# Patient Record
Sex: FEMALE | Race: WHITE | NOT HISPANIC OR LATINO | Employment: FULL TIME | URBAN - METROPOLITAN AREA
[De-identification: names, ages, dates, MRNs, and addresses within clinical notes are randomized per-mention and may not be internally consistent; named-entity substitution may affect disease eponyms.]

---

## 2017-03-02 ENCOUNTER — APPOINTMENT (OUTPATIENT)
Dept: AUDIOLOGY | Age: 62
End: 2017-03-02
Payer: OTHER MISCELLANEOUS

## 2017-03-02 PROCEDURE — 92557 COMPREHENSIVE HEARING TEST: CPT | Performed by: AUDIOLOGIST

## 2017-03-02 PROCEDURE — 92567 TYMPANOMETRY: CPT | Performed by: AUDIOLOGIST

## 2017-04-18 DIAGNOSIS — M85.80 OTHER SPECIFIED DISORDERS OF BONE DENSITY AND STRUCTURE, UNSPECIFIED SITE: ICD-10-CM

## 2017-04-20 ENCOUNTER — HOSPITAL ENCOUNTER (OUTPATIENT)
Dept: RADIOLOGY | Age: 62
Discharge: HOME | End: 2017-04-20
Payer: COMMERCIAL

## 2017-04-20 DIAGNOSIS — Z12.31 ENCOUNTER FOR SCREENING MAMMOGRAM FOR MALIGNANT NEOPLASM OF BREAST: ICD-10-CM

## 2017-04-20 PROCEDURE — G0202 SCR MAMMO BI INCL CAD: HCPCS

## 2017-05-04 DIAGNOSIS — R92.8 OTHER ABNORMAL AND INCONCLUSIVE FINDINGS ON DIAGNOSTIC IMAGING OF BREAST: ICD-10-CM

## 2017-05-05 ENCOUNTER — GENERIC CONVERSION - ENCOUNTER (OUTPATIENT)
Dept: OTHER | Facility: OTHER | Age: 62
End: 2017-05-05

## 2017-05-05 ENCOUNTER — HOSPITAL ENCOUNTER (OUTPATIENT)
Dept: ULTRASOUND IMAGING | Facility: CLINIC | Age: 62
Discharge: HOME | End: 2017-05-05
Payer: COMMERCIAL

## 2017-05-05 ENCOUNTER — HOSPITAL ENCOUNTER (OUTPATIENT)
Dept: MAMMOGRAPHY | Facility: CLINIC | Age: 62
Discharge: HOME | End: 2017-05-05
Payer: COMMERCIAL

## 2017-05-05 DIAGNOSIS — R92.8 OTHER ABNORMAL AND INCONCLUSIVE FINDINGS ON DIAGNOSTIC IMAGING OF BREAST: ICD-10-CM

## 2017-05-05 PROCEDURE — G0206 DX MAMMO INCL CAD UNI: HCPCS

## 2017-05-05 PROCEDURE — 76642 ULTRASOUND BREAST LIMITED: CPT

## 2017-05-05 RX ORDER — MULTIVITAMIN
2 TABLET ORAL
COMMUNITY

## 2017-05-05 RX ORDER — TRIAMTERENE AND HYDROCHLOROTHIAZIDE 37.5; 25 MG/1; MG/1
TABLET ORAL
COMMUNITY
Start: 2012-01-05

## 2017-05-10 ENCOUNTER — HOSPITAL ENCOUNTER (OUTPATIENT)
Dept: MAMMOGRAPHY | Facility: CLINIC | Age: 62
Discharge: HOME | End: 2017-05-10
Payer: COMMERCIAL

## 2017-05-10 ENCOUNTER — CONVERSION ENCOUNTER (OUTPATIENT)
Dept: MAMMOGRAPHY | Facility: HOSPITAL | Age: 62
End: 2017-05-10

## 2017-05-10 ENCOUNTER — HOSPITAL ENCOUNTER (OUTPATIENT)
Dept: ULTRASOUND IMAGING | Facility: CLINIC | Age: 62
Discharge: HOME | End: 2017-05-10
Payer: COMMERCIAL

## 2017-05-10 ENCOUNTER — HOSPITAL ENCOUNTER (OUTPATIENT)
Dept: ULTRASOUND IMAGING | Facility: CLINIC | Age: 62
Discharge: HOME | End: 2017-05-10
Admitting: RADIOLOGY
Payer: COMMERCIAL

## 2017-05-10 VITALS — SYSTOLIC BLOOD PRESSURE: 120 MMHG | HEART RATE: 72 BPM | DIASTOLIC BLOOD PRESSURE: 70 MMHG

## 2017-05-10 DIAGNOSIS — R92.8 OTHER ABNORMAL AND INCONCLUSIVE FINDINGS ON DIAGNOSTIC IMAGING OF BREAST: ICD-10-CM

## 2017-05-10 DIAGNOSIS — R92.8 ABNORMAL FINDINGS ON DIAGNOSTIC IMAGING OF BREAST: ICD-10-CM

## 2017-05-10 DIAGNOSIS — R92.0 ABNORMAL FINDING ON MAMMOGRAPHY, MICROCALCIFICATION: ICD-10-CM

## 2017-05-10 PROCEDURE — 88341 IMHCHEM/IMCYTCHM EA ADD ANTB: CPT | Performed by: OBSTETRICS & GYNECOLOGY

## 2017-05-10 PROCEDURE — 88361 TUMOR IMMUNOHISTOCHEM/COMPUT: CPT | Performed by: OBSTETRICS & GYNECOLOGY

## 2017-05-10 PROCEDURE — 88305 TISSUE EXAM BY PATHOLOGIST: CPT | Performed by: OBSTETRICS & GYNECOLOGY

## 2017-05-10 PROCEDURE — 88342 IMHCHEM/IMCYTCHM 1ST ANTB: CPT | Performed by: OBSTETRICS & GYNECOLOGY

## 2017-05-10 PROCEDURE — 19084 BX BREAST ADD LESION US IMAG: CPT

## 2017-05-10 PROCEDURE — 19083 BX BREAST 1ST LESION US IMAG: CPT

## 2017-05-10 RX ORDER — LIDOCAINE HYDROCHLORIDE 10 MG/ML
4 INJECTION, SOLUTION INFILTRATION; PERINEURAL ONCE
Status: COMPLETED | OUTPATIENT
Start: 2017-05-10 | End: 2017-05-10

## 2017-05-10 RX ORDER — SODIUM BICARBONATE 42 MG/ML
1 INJECTION, SOLUTION INTRAVENOUS ONCE
Status: COMPLETED | OUTPATIENT
Start: 2017-05-10 | End: 2017-05-10

## 2017-05-10 RX ADMIN — SODIUM BICARBONATE 0.5 MEQ: 42 SOLUTION INTRAVENOUS at 10:39

## 2017-05-10 RX ADMIN — LIDOCAINE HYDROCHLORIDE 4 ML: 10 INJECTION, SOLUTION INFILTRATION; PERINEURAL at 10:39

## 2017-05-10 RX ADMIN — LIDOCAINE HYDROCHLORIDE 4 ML: 10 INJECTION, SOLUTION INFILTRATION; PERINEURAL at 10:30

## 2017-05-10 RX ADMIN — SODIUM BICARBONATE 0.5 MEQ: 42 SOLUTION INTRAVENOUS at 10:30

## 2017-05-17 ENCOUNTER — ALLSCRIPTS OFFICE VISIT (OUTPATIENT)
Dept: OTHER | Facility: OTHER | Age: 62
End: 2017-05-17

## 2017-05-18 ENCOUNTER — OFFICE VISIT (OUTPATIENT)
Dept: LAB | Facility: CLINIC | Age: 62
End: 2017-05-18
Payer: COMMERCIAL

## 2017-05-18 ENCOUNTER — TRANSCRIBE ORDERS (OUTPATIENT)
Dept: LAB | Facility: CLINIC | Age: 62
End: 2017-05-18

## 2017-05-18 ENCOUNTER — APPOINTMENT (OUTPATIENT)
Dept: LAB | Facility: CLINIC | Age: 62
End: 2017-05-18
Payer: COMMERCIAL

## 2017-05-18 ENCOUNTER — HOSPITAL ENCOUNTER (OUTPATIENT)
Dept: RADIOLOGY | Facility: HOSPITAL | Age: 62
Discharge: HOME | End: 2017-05-18
Attending: SURGERY
Payer: COMMERCIAL

## 2017-05-18 DIAGNOSIS — C50.412 MALIGNANT NEOPLASM OF UPPER-OUTER QUADRANT OF LEFT FEMALE BREAST (HCC): ICD-10-CM

## 2017-05-18 DIAGNOSIS — C50.412 MALIGNANT NEOPLASM OF UPPER-OUTER QUADRANT OF LEFT FEMALE BREAST (HCC): Primary | ICD-10-CM

## 2017-05-18 LAB
ALBUMIN SERPL BCP-MCNC: 4 G/DL (ref 3.5–5)
ALP SERPL-CCNC: 95 U/L (ref 46–116)
ALT SERPL W P-5'-P-CCNC: 44 U/L (ref 12–78)
ANION GAP SERPL CALCULATED.3IONS-SCNC: 9 MMOL/L (ref 4–13)
AST SERPL W P-5'-P-CCNC: 27 U/L (ref 5–45)
BASOPHILS # BLD AUTO: 0.02 THOUSANDS/ΜL (ref 0–0.1)
BASOPHILS NFR BLD AUTO: 0 % (ref 0–1)
BILIRUB SERPL-MCNC: 0.6 MG/DL (ref 0.2–1)
BUN SERPL-MCNC: 21 MG/DL (ref 5–25)
CALCIUM SERPL-MCNC: 9.1 MG/DL (ref 8.3–10.1)
CHLORIDE SERPL-SCNC: 107 MMOL/L (ref 100–108)
CO2 SERPL-SCNC: 28 MMOL/L (ref 21–32)
CREAT SERPL-MCNC: 0.72 MG/DL (ref 0.6–1.3)
EOSINOPHIL # BLD AUTO: 0.14 THOUSAND/ΜL (ref 0–0.61)
EOSINOPHIL NFR BLD AUTO: 2 % (ref 0–6)
ERYTHROCYTE [DISTWIDTH] IN BLOOD BY AUTOMATED COUNT: 12.9 % (ref 11.6–15.1)
GFR SERPL CREATININE-BSD FRML MDRD: >60 ML/MIN/1.73SQ M
GLUCOSE SERPL-MCNC: 92 MG/DL (ref 65–140)
HCT VFR BLD AUTO: 40.8 % (ref 34.8–46.1)
HGB BLD-MCNC: 13.9 G/DL (ref 11.5–15.4)
LYMPHOCYTES # BLD AUTO: 2.77 THOUSANDS/ΜL (ref 0.6–4.47)
LYMPHOCYTES NFR BLD AUTO: 36 % (ref 14–44)
MCH RBC QN AUTO: 30.5 PG (ref 26.8–34.3)
MCHC RBC AUTO-ENTMCNC: 34.1 G/DL (ref 31.4–37.4)
MCV RBC AUTO: 90 FL (ref 82–98)
MONOCYTES # BLD AUTO: 0.43 THOUSAND/ΜL (ref 0.17–1.22)
MONOCYTES NFR BLD AUTO: 6 % (ref 4–12)
NEUTROPHILS # BLD AUTO: 4.41 THOUSANDS/ΜL (ref 1.85–7.62)
NEUTS SEG NFR BLD AUTO: 56 % (ref 43–75)
PLATELET # BLD AUTO: 222 THOUSANDS/UL (ref 149–390)
PMV BLD AUTO: 10.2 FL (ref 8.9–12.7)
POTASSIUM SERPL-SCNC: 4.1 MMOL/L (ref 3.5–5.3)
PROT SERPL-MCNC: 7 G/DL (ref 6.4–8.2)
RBC # BLD AUTO: 4.56 MILLION/UL (ref 3.81–5.12)
SODIUM SERPL-SCNC: 144 MMOL/L (ref 136–145)
WBC # BLD AUTO: 7.77 THOUSAND/UL (ref 4.31–10.16)

## 2017-05-18 PROCEDURE — 36415 COLL VENOUS BLD VENIPUNCTURE: CPT

## 2017-05-18 PROCEDURE — 71020 HB CHEST X-RAY 2VW FRONTAL&LATL: CPT

## 2017-05-18 PROCEDURE — 93005 ELECTROCARDIOGRAM TRACING: CPT

## 2017-05-18 PROCEDURE — 80053 COMPREHEN METABOLIC PANEL: CPT

## 2017-05-18 PROCEDURE — 85025 COMPLETE CBC W/AUTO DIFF WBC: CPT

## 2017-05-19 LAB
ATRIAL RATE: 65 BPM
P AXIS: 55 DEGREES
PR INTERVAL: 152 MS
QRS AXIS: 36 DEGREES
QRSD INTERVAL: 76 MS
QT INTERVAL: 396 MS
QTC INTERVAL: 411 MS
T WAVE AXIS: 70 DEGREES
VENTRICULAR RATE: 65 BPM

## 2017-05-30 ENCOUNTER — HOSPITAL ENCOUNTER (OUTPATIENT)
Dept: NUCLEAR MEDICINE | Facility: HOSPITAL | Age: 62
Discharge: HOME | End: 2017-05-30
Attending: SURGERY
Payer: COMMERCIAL

## 2017-05-30 ENCOUNTER — HOSPITAL ENCOUNTER (OUTPATIENT)
Dept: MAMMOGRAPHY | Facility: HOSPITAL | Age: 62
Discharge: HOME | End: 2017-05-30
Attending: SURGERY
Payer: COMMERCIAL

## 2017-05-30 ENCOUNTER — ANESTHESIA (OUTPATIENT)
Dept: PERIOP | Facility: HOSPITAL | Age: 62
End: 2017-05-30
Payer: COMMERCIAL

## 2017-05-30 ENCOUNTER — ANESTHESIA EVENT (OUTPATIENT)
Dept: PERIOP | Facility: HOSPITAL | Age: 62
End: 2017-05-30
Payer: COMMERCIAL

## 2017-05-30 ENCOUNTER — HOSPITAL ENCOUNTER (OUTPATIENT)
Facility: HOSPITAL | Age: 62
Setting detail: OUTPATIENT SURGERY
Discharge: HOME | End: 2017-05-30
Attending: SURGERY | Admitting: SURGERY
Payer: COMMERCIAL

## 2017-05-30 ENCOUNTER — APPOINTMENT (OUTPATIENT)
Dept: MAMMOGRAPHY | Facility: HOSPITAL | Age: 62
End: 2017-05-30
Payer: COMMERCIAL

## 2017-05-30 VITALS
SYSTOLIC BLOOD PRESSURE: 124 MMHG | RESPIRATION RATE: 13 BRPM | HEART RATE: 82 BPM | DIASTOLIC BLOOD PRESSURE: 71 MMHG | BODY MASS INDEX: 21.86 KG/M2 | WEIGHT: 136 LBS | TEMPERATURE: 97.5 F | OXYGEN SATURATION: 96 % | HEIGHT: 66 IN

## 2017-05-30 VITALS
DIASTOLIC BLOOD PRESSURE: 75 MMHG | RESPIRATION RATE: 20 BRPM | HEART RATE: 89 BPM | SYSTOLIC BLOOD PRESSURE: 133 MMHG | OXYGEN SATURATION: 98 % | TEMPERATURE: 97.5 F

## 2017-05-30 DIAGNOSIS — C50.412 MALIGNANT NEOPLASM OF UPPER-OUTER QUADRANT OF LEFT FEMALE BREAST (HCC): ICD-10-CM

## 2017-05-30 PROCEDURE — 78195 LYMPH SYSTEM IMAGING: CPT

## 2017-05-30 PROCEDURE — 19282 PERQ DEVICE BREAST EA IMAG: CPT

## 2017-05-30 PROCEDURE — 88341 IMHCHEM/IMCYTCHM EA ADD ANTB: CPT | Performed by: SURGERY

## 2017-05-30 PROCEDURE — 88342 IMHCHEM/IMCYTCHM 1ST ANTB: CPT | Performed by: SURGERY

## 2017-05-30 PROCEDURE — 19281 PERQ DEVICE BREAST 1ST IMAG: CPT

## 2017-05-30 PROCEDURE — 88307 TISSUE EXAM BY PATHOLOGIST: CPT | Performed by: SURGERY

## 2017-05-30 PROCEDURE — A9541 TC99M SULFUR COLLOID: HCPCS

## 2017-05-30 RX ORDER — GLYCOPYRROLATE 0.2 MG/ML
INJECTION INTRAMUSCULAR; INTRAVENOUS AS NEEDED
Status: DISCONTINUED | OUTPATIENT
Start: 2017-05-30 | End: 2017-05-30 | Stop reason: SURG

## 2017-05-30 RX ORDER — FENTANYL CITRATE/PF 50 MCG/ML
25 SYRINGE (ML) INJECTION
Status: DISCONTINUED | OUTPATIENT
Start: 2017-05-30 | End: 2017-05-30 | Stop reason: HOSPADM

## 2017-05-30 RX ORDER — METHYLENE BLUE 10 MG/ML
INJECTION INTRAVENOUS AS NEEDED
Status: DISCONTINUED | OUTPATIENT
Start: 2017-05-30 | End: 2017-05-30 | Stop reason: HOSPADM

## 2017-05-30 RX ORDER — SCOLOPAMINE TRANSDERMAL SYSTEM 1 MG/1
PATCH, EXTENDED RELEASE TRANSDERMAL
Status: DISCONTINUED
Start: 2017-05-30 | End: 2017-05-30 | Stop reason: HOSPADM

## 2017-05-30 RX ORDER — SCOLOPAMINE TRANSDERMAL SYSTEM 1 MG/1
1 PATCH, EXTENDED RELEASE TRANSDERMAL
Status: DISCONTINUED | OUTPATIENT
Start: 2017-05-30 | End: 2017-05-30 | Stop reason: HOSPADM

## 2017-05-30 RX ORDER — OXYCODONE HYDROCHLORIDE AND ACETAMINOPHEN 5; 325 MG/1; MG/1
1 TABLET ORAL EVERY 4 HOURS PRN
Status: DISCONTINUED | OUTPATIENT
Start: 2017-05-30 | End: 2017-05-30 | Stop reason: HOSPADM

## 2017-05-30 RX ORDER — METOCLOPRAMIDE HYDROCHLORIDE 5 MG/ML
INJECTION INTRAMUSCULAR; INTRAVENOUS AS NEEDED
Status: DISCONTINUED | OUTPATIENT
Start: 2017-05-30 | End: 2017-05-30 | Stop reason: SURG

## 2017-05-30 RX ORDER — LIDOCAINE WITH 8.4% SOD BICARB 0.9%(10ML)
5 SYRINGE (ML) INJECTION ONCE
Status: COMPLETED | OUTPATIENT
Start: 2017-05-30 | End: 2017-05-30

## 2017-05-30 RX ORDER — FENTANYL CITRATE 50 UG/ML
INJECTION, SOLUTION INTRAMUSCULAR; INTRAVENOUS AS NEEDED
Status: DISCONTINUED | OUTPATIENT
Start: 2017-05-30 | End: 2017-05-30 | Stop reason: SURG

## 2017-05-30 RX ORDER — MEPERIDINE HYDROCHLORIDE 25 MG/ML
12.5 INJECTION INTRAMUSCULAR; INTRAVENOUS; SUBCUTANEOUS ONCE AS NEEDED
Status: COMPLETED | OUTPATIENT
Start: 2017-05-30 | End: 2017-05-30

## 2017-05-30 RX ORDER — BUPIVACAINE HYDROCHLORIDE AND EPINEPHRINE 2.5; 5 MG/ML; UG/ML
INJECTION, SOLUTION INFILTRATION; PERINEURAL AS NEEDED
Status: DISCONTINUED | OUTPATIENT
Start: 2017-05-30 | End: 2017-05-30 | Stop reason: HOSPADM

## 2017-05-30 RX ORDER — SODIUM CHLORIDE, SODIUM LACTATE, POTASSIUM CHLORIDE, CALCIUM CHLORIDE 600; 310; 30; 20 MG/100ML; MG/100ML; MG/100ML; MG/100ML
INJECTION, SOLUTION INTRAVENOUS CONTINUOUS PRN
Status: DISCONTINUED | OUTPATIENT
Start: 2017-05-30 | End: 2017-05-30 | Stop reason: SURG

## 2017-05-30 RX ORDER — ONDANSETRON 2 MG/ML
4 INJECTION INTRAMUSCULAR; INTRAVENOUS ONCE AS NEEDED
Status: DISCONTINUED | OUTPATIENT
Start: 2017-05-30 | End: 2017-05-30 | Stop reason: HOSPADM

## 2017-05-30 RX ORDER — LIDOCAINE HYDROCHLORIDE 10 MG/ML
INJECTION, SOLUTION INFILTRATION; PERINEURAL AS NEEDED
Status: DISCONTINUED | OUTPATIENT
Start: 2017-05-30 | End: 2017-05-30 | Stop reason: SURG

## 2017-05-30 RX ORDER — MIDAZOLAM HYDROCHLORIDE 1 MG/ML
INJECTION INTRAMUSCULAR; INTRAVENOUS AS NEEDED
Status: DISCONTINUED | OUTPATIENT
Start: 2017-05-30 | End: 2017-05-30 | Stop reason: SURG

## 2017-05-30 RX ORDER — ONDANSETRON 2 MG/ML
INJECTION INTRAMUSCULAR; INTRAVENOUS AS NEEDED
Status: DISCONTINUED | OUTPATIENT
Start: 2017-05-30 | End: 2017-05-30 | Stop reason: SURG

## 2017-05-30 RX ORDER — MAGNESIUM HYDROXIDE 1200 MG/15ML
LIQUID ORAL AS NEEDED
Status: DISCONTINUED | OUTPATIENT
Start: 2017-05-30 | End: 2017-05-30 | Stop reason: HOSPADM

## 2017-05-30 RX ORDER — PROPOFOL 10 MG/ML
INJECTION, EMULSION INTRAVENOUS AS NEEDED
Status: DISCONTINUED | OUTPATIENT
Start: 2017-05-30 | End: 2017-05-30 | Stop reason: SURG

## 2017-05-30 RX ORDER — KETOROLAC TROMETHAMINE 30 MG/ML
INJECTION, SOLUTION INTRAMUSCULAR; INTRAVENOUS AS NEEDED
Status: DISCONTINUED | OUTPATIENT
Start: 2017-05-30 | End: 2017-05-30 | Stop reason: SURG

## 2017-05-30 RX ORDER — SCOLOPAMINE TRANSDERMAL SYSTEM 1 MG/1
PATCH, EXTENDED RELEASE TRANSDERMAL
Status: DISCONTINUED
Start: 2017-05-30 | End: 2017-05-30 | Stop reason: WASHOUT

## 2017-05-30 RX ADMIN — METOCLOPRAMIDE HYDROCHLORIDE 10 MG: 5 INJECTION INTRAMUSCULAR; INTRAVENOUS at 08:18

## 2017-05-30 RX ADMIN — SODIUM CHLORIDE, SODIUM LACTATE, POTASSIUM CHLORIDE, AND CALCIUM CHLORIDE: .6; .31; .03; .02 INJECTION, SOLUTION INTRAVENOUS at 08:02

## 2017-05-30 RX ADMIN — LIDOCAINE HYDROCHLORIDE 5 ML: 10 INJECTION, SOLUTION INFILTRATION; PERINEURAL at 07:30

## 2017-05-30 RX ADMIN — MEPERIDINE HYDROCHLORIDE 12.5 MG: 25 INJECTION, SOLUTION INTRAMUSCULAR; INTRAVENOUS; SUBCUTANEOUS at 09:33

## 2017-05-30 RX ADMIN — KETOROLAC TROMETHAMINE 30 MG: 30 INJECTION, SOLUTION INTRAMUSCULAR at 08:50

## 2017-05-30 RX ADMIN — LIDOCAINE HYDROCHLORIDE 5 ML: 10 INJECTION, SOLUTION INFILTRATION; PERINEURAL at 07:25

## 2017-05-30 RX ADMIN — PROPOFOL 200 MG: 10 INJECTION, EMULSION INTRAVENOUS at 08:18

## 2017-05-30 RX ADMIN — DEXAMETHASONE SODIUM PHOSPHATE 10 MG: 10 INJECTION INTRAMUSCULAR; INTRAVENOUS at 08:18

## 2017-05-30 RX ADMIN — ONDANSETRON 4 MG: 2 INJECTION INTRAMUSCULAR; INTRAVENOUS at 08:18

## 2017-05-30 RX ADMIN — FENTANYL CITRATE 25 MCG: 50 INJECTION INTRAMUSCULAR; INTRAVENOUS at 09:51

## 2017-05-30 RX ADMIN — SCOPALAMINE 1 PATCH: 1 PATCH, EXTENDED RELEASE TRANSDERMAL at 08:14

## 2017-05-30 RX ADMIN — GLYCOPYRROLATE 0.2 MG: 0.2 INJECTION INTRAMUSCULAR; INTRAVENOUS at 08:18

## 2017-05-30 RX ADMIN — LIDOCAINE HYDROCHLORIDE 50 MG: 10 INJECTION, SOLUTION INFILTRATION; PERINEURAL at 08:18

## 2017-05-30 RX ADMIN — FENTANYL CITRATE 50 MCG: 50 INJECTION INTRAMUSCULAR; INTRAVENOUS at 08:20

## 2017-05-30 RX ADMIN — FENTANYL CITRATE 50 MCG: 50 INJECTION INTRAMUSCULAR; INTRAVENOUS at 08:42

## 2017-05-30 RX ADMIN — FENTANYL CITRATE 25 MCG: 50 INJECTION INTRAMUSCULAR; INTRAVENOUS at 09:46

## 2017-05-30 RX ADMIN — CEFAZOLIN SODIUM 1000 MG: 1 SOLUTION INTRAVENOUS at 08:14

## 2017-05-30 RX ADMIN — OXYCODONE HYDROCHLORIDE AND ACETAMINOPHEN 1 TABLET: 5; 325 TABLET ORAL at 10:45

## 2017-05-30 RX ADMIN — MIDAZOLAM HYDROCHLORIDE 2 MG: 1 INJECTION, SOLUTION INTRAMUSCULAR; INTRAVENOUS at 08:16

## 2017-06-21 ENCOUNTER — ALLSCRIPTS OFFICE VISIT (OUTPATIENT)
Dept: OTHER | Facility: OTHER | Age: 62
End: 2017-06-21

## 2017-06-29 LAB — SCAN RESULT: NORMAL

## 2017-07-13 ENCOUNTER — ALLSCRIPTS OFFICE VISIT (OUTPATIENT)
Dept: OTHER | Facility: OTHER | Age: 62
End: 2017-07-13

## 2017-07-14 ENCOUNTER — APPOINTMENT (OUTPATIENT)
Dept: RADIATION ONCOLOGY | Facility: HOSPITAL | Age: 62
End: 2017-07-14
Attending: RADIOLOGY
Payer: COMMERCIAL

## 2017-07-14 ENCOUNTER — GENERIC CONVERSION - ENCOUNTER (OUTPATIENT)
Dept: OTHER | Facility: OTHER | Age: 62
End: 2017-07-14

## 2017-07-14 PROCEDURE — 99215 OFFICE O/P EST HI 40 MIN: CPT | Performed by: RADIOLOGY

## 2017-07-17 ENCOUNTER — APPOINTMENT (OUTPATIENT)
Dept: RADIATION ONCOLOGY | Facility: CLINIC | Age: 62
End: 2017-07-17
Attending: RADIOLOGY
Payer: COMMERCIAL

## 2017-07-17 PROCEDURE — 77290 THER RAD SIMULAJ FIELD CPLX: CPT | Performed by: RADIOLOGY

## 2017-07-17 PROCEDURE — 77332 RADIATION TREATMENT AID(S): CPT | Performed by: RADIOLOGY

## 2017-07-17 PROCEDURE — 77293 RESPIRATOR MOTION MGMT SIMUL: CPT | Performed by: RADIOLOGY

## 2017-07-20 ENCOUNTER — GENERIC CONVERSION - ENCOUNTER (OUTPATIENT)
Dept: OTHER | Facility: OTHER | Age: 62
End: 2017-07-20

## 2017-07-20 PROCEDURE — 77295 3-D RADIOTHERAPY PLAN: CPT | Performed by: RADIOLOGY

## 2017-07-20 PROCEDURE — 77334 RADIATION TREATMENT AID(S): CPT | Performed by: RADIOLOGY

## 2017-07-20 PROCEDURE — 77300 RADIATION THERAPY DOSE PLAN: CPT | Performed by: RADIOLOGY

## 2017-07-24 ENCOUNTER — APPOINTMENT (OUTPATIENT)
Dept: RADIATION ONCOLOGY | Facility: HOSPITAL | Age: 62
End: 2017-07-24
Attending: RADIOLOGY
Payer: COMMERCIAL

## 2017-07-25 PROCEDURE — 77331 SPECIAL RADIATION DOSIMETRY: CPT | Performed by: RADIOLOGY

## 2017-07-25 PROCEDURE — 77412 RADIATION TX DELIVERY LVL 3: CPT | Performed by: RADIOLOGY

## 2017-07-25 PROCEDURE — 77280 THER RAD SIMULAJ FIELD SMPL: CPT | Performed by: RADIOLOGY

## 2017-07-25 PROCEDURE — 77387 GUIDANCE FOR RADJ TX DLVR: CPT | Performed by: RADIOLOGY

## 2017-07-26 PROCEDURE — 77412 RADIATION TX DELIVERY LVL 3: CPT | Performed by: RADIOLOGY

## 2017-07-26 PROCEDURE — 77387 GUIDANCE FOR RADJ TX DLVR: CPT | Performed by: RADIOLOGY

## 2017-07-27 DIAGNOSIS — C50.412 MALIGNANT NEOPLASM OF UPPER-OUTER QUADRANT OF LEFT FEMALE BREAST (HCC): ICD-10-CM

## 2017-07-27 PROCEDURE — 77387 GUIDANCE FOR RADJ TX DLVR: CPT | Performed by: RADIOLOGY

## 2017-07-27 PROCEDURE — 77412 RADIATION TX DELIVERY LVL 3: CPT | Performed by: RADIOLOGY

## 2017-07-28 PROCEDURE — 77387 GUIDANCE FOR RADJ TX DLVR: CPT | Performed by: RADIOLOGY

## 2017-07-28 PROCEDURE — 77412 RADIATION TX DELIVERY LVL 3: CPT | Performed by: RADIOLOGY

## 2017-07-31 PROCEDURE — 77417 THER RADIOLOGY PORT IMAGE(S): CPT | Performed by: RADIOLOGY

## 2017-07-31 PROCEDURE — 77387 GUIDANCE FOR RADJ TX DLVR: CPT | Performed by: RADIOLOGY

## 2017-07-31 PROCEDURE — 77412 RADIATION TX DELIVERY LVL 3: CPT | Performed by: RADIOLOGY

## 2017-07-31 PROCEDURE — 77336 RADIATION PHYSICS CONSULT: CPT | Performed by: RADIOLOGY

## 2017-08-01 ENCOUNTER — APPOINTMENT (OUTPATIENT)
Dept: RADIATION ONCOLOGY | Facility: HOSPITAL | Age: 62
End: 2017-08-01
Attending: RADIOLOGY
Payer: COMMERCIAL

## 2017-08-01 PROCEDURE — 77387 GUIDANCE FOR RADJ TX DLVR: CPT | Performed by: RADIOLOGY

## 2017-08-01 PROCEDURE — 77412 RADIATION TX DELIVERY LVL 3: CPT | Performed by: RADIOLOGY

## 2017-08-02 PROCEDURE — 77387 GUIDANCE FOR RADJ TX DLVR: CPT | Performed by: RADIOLOGY

## 2017-08-02 PROCEDURE — 77412 RADIATION TX DELIVERY LVL 3: CPT | Performed by: RADIOLOGY

## 2017-08-03 PROCEDURE — 77387 GUIDANCE FOR RADJ TX DLVR: CPT | Performed by: RADIOLOGY

## 2017-08-03 PROCEDURE — 77412 RADIATION TX DELIVERY LVL 3: CPT | Performed by: RADIOLOGY

## 2017-08-04 PROCEDURE — 77412 RADIATION TX DELIVERY LVL 3: CPT | Performed by: RADIOLOGY

## 2017-08-04 PROCEDURE — 77387 GUIDANCE FOR RADJ TX DLVR: CPT | Performed by: RADIOLOGY

## 2017-08-07 PROCEDURE — 77387 GUIDANCE FOR RADJ TX DLVR: CPT | Performed by: RADIOLOGY

## 2017-08-07 PROCEDURE — 77417 THER RADIOLOGY PORT IMAGE(S): CPT | Performed by: RADIOLOGY

## 2017-08-07 PROCEDURE — 77336 RADIATION PHYSICS CONSULT: CPT | Performed by: RADIOLOGY

## 2017-08-07 PROCEDURE — 77412 RADIATION TX DELIVERY LVL 3: CPT | Performed by: RADIOLOGY

## 2017-08-08 PROCEDURE — 77387 GUIDANCE FOR RADJ TX DLVR: CPT | Performed by: RADIOLOGY

## 2017-08-08 PROCEDURE — 77412 RADIATION TX DELIVERY LVL 3: CPT | Performed by: RADIOLOGY

## 2017-08-09 PROCEDURE — 77412 RADIATION TX DELIVERY LVL 3: CPT | Performed by: RADIOLOGY

## 2017-08-09 PROCEDURE — 77387 GUIDANCE FOR RADJ TX DLVR: CPT | Performed by: RADIOLOGY

## 2017-08-10 PROCEDURE — 77412 RADIATION TX DELIVERY LVL 3: CPT | Performed by: RADIOLOGY

## 2017-08-10 PROCEDURE — 77387 GUIDANCE FOR RADJ TX DLVR: CPT | Performed by: RADIOLOGY

## 2017-08-11 PROCEDURE — 77412 RADIATION TX DELIVERY LVL 3: CPT | Performed by: RADIOLOGY

## 2017-08-11 PROCEDURE — 77387 GUIDANCE FOR RADJ TX DLVR: CPT | Performed by: RADIOLOGY

## 2017-08-14 PROCEDURE — 77387 GUIDANCE FOR RADJ TX DLVR: CPT | Performed by: RADIOLOGY

## 2017-08-14 PROCEDURE — 77336 RADIATION PHYSICS CONSULT: CPT | Performed by: RADIOLOGY

## 2017-08-14 PROCEDURE — 77417 THER RADIOLOGY PORT IMAGE(S): CPT | Performed by: RADIOLOGY

## 2017-08-14 PROCEDURE — 77412 RADIATION TX DELIVERY LVL 3: CPT | Performed by: RADIOLOGY

## 2017-08-15 PROCEDURE — 77387 GUIDANCE FOR RADJ TX DLVR: CPT | Performed by: RADIOLOGY

## 2017-08-15 PROCEDURE — 77412 RADIATION TX DELIVERY LVL 3: CPT | Performed by: RADIOLOGY

## 2017-08-16 PROCEDURE — 77387 GUIDANCE FOR RADJ TX DLVR: CPT | Performed by: RADIOLOGY

## 2017-08-16 PROCEDURE — 77412 RADIATION TX DELIVERY LVL 3: CPT | Performed by: RADIOLOGY

## 2017-08-16 PROCEDURE — 77417 THER RADIOLOGY PORT IMAGE(S): CPT | Performed by: RADIOLOGY

## 2017-08-17 PROCEDURE — 77412 RADIATION TX DELIVERY LVL 3: CPT | Performed by: RADIOLOGY

## 2017-08-17 PROCEDURE — 77387 GUIDANCE FOR RADJ TX DLVR: CPT | Performed by: RADIOLOGY

## 2017-08-18 PROCEDURE — 77412 RADIATION TX DELIVERY LVL 3: CPT | Performed by: RADIOLOGY

## 2017-08-18 PROCEDURE — 77387 GUIDANCE FOR RADJ TX DLVR: CPT | Performed by: RADIOLOGY

## 2017-08-21 PROCEDURE — 77417 THER RADIOLOGY PORT IMAGE(S): CPT | Performed by: RADIOLOGY

## 2017-08-21 PROCEDURE — 77387 GUIDANCE FOR RADJ TX DLVR: CPT | Performed by: RADIOLOGY

## 2017-08-21 PROCEDURE — 77336 RADIATION PHYSICS CONSULT: CPT | Performed by: RADIOLOGY

## 2017-08-21 PROCEDURE — 77412 RADIATION TX DELIVERY LVL 3: CPT | Performed by: RADIOLOGY

## 2017-08-22 PROCEDURE — 77300 RADIATION THERAPY DOSE PLAN: CPT | Performed by: RADIOLOGY

## 2017-08-22 PROCEDURE — 77321 SPECIAL TELETX PORT PLAN: CPT | Performed by: RADIOLOGY

## 2017-08-22 PROCEDURE — 77387 GUIDANCE FOR RADJ TX DLVR: CPT | Performed by: RADIOLOGY

## 2017-08-22 PROCEDURE — 77334 RADIATION TREATMENT AID(S): CPT | Performed by: RADIOLOGY

## 2017-08-22 PROCEDURE — 77412 RADIATION TX DELIVERY LVL 3: CPT | Performed by: RADIOLOGY

## 2017-08-23 PROCEDURE — 77412 RADIATION TX DELIVERY LVL 3: CPT | Performed by: RADIOLOGY

## 2017-08-23 PROCEDURE — 77387 GUIDANCE FOR RADJ TX DLVR: CPT | Performed by: RADIOLOGY

## 2017-08-24 PROCEDURE — 77412 RADIATION TX DELIVERY LVL 3: CPT | Performed by: RADIOLOGY

## 2017-08-24 PROCEDURE — 77387 GUIDANCE FOR RADJ TX DLVR: CPT | Performed by: RADIOLOGY

## 2017-08-25 PROCEDURE — 77387 GUIDANCE FOR RADJ TX DLVR: CPT | Performed by: RADIOLOGY

## 2017-08-25 PROCEDURE — 77412 RADIATION TX DELIVERY LVL 3: CPT | Performed by: RADIOLOGY

## 2017-08-28 PROCEDURE — 77412 RADIATION TX DELIVERY LVL 3: CPT | Performed by: RADIOLOGY

## 2017-08-28 PROCEDURE — 77387 GUIDANCE FOR RADJ TX DLVR: CPT | Performed by: RADIOLOGY

## 2017-08-28 PROCEDURE — 77336 RADIATION PHYSICS CONSULT: CPT | Performed by: RADIOLOGY

## 2017-08-29 PROCEDURE — 77412 RADIATION TX DELIVERY LVL 3: CPT | Performed by: RADIOLOGY

## 2017-08-29 PROCEDURE — 77280 THER RAD SIMULAJ FIELD SMPL: CPT | Performed by: RADIOLOGY

## 2017-08-29 PROCEDURE — 77331 SPECIAL RADIATION DOSIMETRY: CPT | Performed by: RADIOLOGY

## 2017-08-30 PROCEDURE — 77387 GUIDANCE FOR RADJ TX DLVR: CPT | Performed by: RADIOLOGY

## 2017-08-30 PROCEDURE — 77412 RADIATION TX DELIVERY LVL 3: CPT | Performed by: RADIOLOGY

## 2017-08-31 PROCEDURE — 77412 RADIATION TX DELIVERY LVL 3: CPT | Performed by: RADIOLOGY

## 2017-08-31 PROCEDURE — 77387 GUIDANCE FOR RADJ TX DLVR: CPT | Performed by: RADIOLOGY

## 2017-09-01 ENCOUNTER — APPOINTMENT (OUTPATIENT)
Dept: RADIATION ONCOLOGY | Facility: HOSPITAL | Age: 62
End: 2017-09-01
Attending: RADIOLOGY
Payer: COMMERCIAL

## 2017-09-01 PROCEDURE — 77387 GUIDANCE FOR RADJ TX DLVR: CPT | Performed by: RADIOLOGY

## 2017-09-01 PROCEDURE — 77412 RADIATION TX DELIVERY LVL 3: CPT | Performed by: RADIOLOGY

## 2017-09-05 PROCEDURE — 77336 RADIATION PHYSICS CONSULT: CPT | Performed by: RADIOLOGY

## 2017-09-05 PROCEDURE — 77387 GUIDANCE FOR RADJ TX DLVR: CPT | Performed by: RADIOLOGY

## 2017-09-05 PROCEDURE — 77412 RADIATION TX DELIVERY LVL 3: CPT | Performed by: RADIOLOGY

## 2017-09-14 ENCOUNTER — ALLSCRIPTS OFFICE VISIT (OUTPATIENT)
Dept: OTHER | Facility: OTHER | Age: 62
End: 2017-09-14

## 2017-09-19 ENCOUNTER — GENERIC CONVERSION - ENCOUNTER (OUTPATIENT)
Dept: OTHER | Facility: OTHER | Age: 62
End: 2017-09-19

## 2017-09-19 DIAGNOSIS — Z00.00 ENCOUNTER FOR GENERAL ADULT MEDICAL EXAMINATION WITHOUT ABNORMAL FINDINGS: ICD-10-CM

## 2017-09-21 ENCOUNTER — APPOINTMENT (OUTPATIENT)
Dept: AUDIOLOGY | Age: 62
End: 2017-09-21
Payer: COMMERCIAL

## 2017-09-21 ENCOUNTER — TRANSCRIBE ORDERS (OUTPATIENT)
Dept: ADMINISTRATIVE | Facility: HOSPITAL | Age: 62
End: 2017-09-21

## 2017-09-21 ENCOUNTER — GENERIC CONVERSION - ENCOUNTER (OUTPATIENT)
Dept: OTHER | Facility: OTHER | Age: 62
End: 2017-09-21

## 2017-09-21 DIAGNOSIS — C50.912 MALIGNANT NEOPLASM OF LEFT FEMALE BREAST, UNSPECIFIED SITE OF BREAST: Primary | ICD-10-CM

## 2017-09-21 PROCEDURE — 92557 COMPREHENSIVE HEARING TEST: CPT | Performed by: AUDIOLOGIST

## 2017-09-21 PROCEDURE — 92567 TYMPANOMETRY: CPT | Performed by: AUDIOLOGIST

## 2017-10-03 ENCOUNTER — APPOINTMENT (OUTPATIENT)
Dept: RADIATION ONCOLOGY | Facility: HOSPITAL | Age: 62
End: 2017-10-03
Attending: RADIOLOGY
Payer: COMMERCIAL

## 2017-10-03 ENCOUNTER — GENERIC CONVERSION - ENCOUNTER (OUTPATIENT)
Dept: OTHER | Facility: OTHER | Age: 62
End: 2017-10-03

## 2017-10-03 PROCEDURE — 99214 OFFICE O/P EST MOD 30 MIN: CPT | Performed by: RADIOLOGY

## 2017-11-15 ENCOUNTER — ALLSCRIPTS OFFICE VISIT (OUTPATIENT)
Dept: OTHER | Facility: OTHER | Age: 62
End: 2017-11-15

## 2017-11-16 NOTE — PROGRESS NOTES
Assessment    1  Malignant neoplasm of upper-outer quadrant of left female breast (174 4) (C50 412)    Plan  Breast cancer, left breast, Carcinoma of left breast, estrogen receptor positive, Encounterfor gynecological examination without abnormal finding, Health Maintenance, Malignantneoplasm of upper-outer quadrant of left female breast    · Follow-up visit in 6 months Evaluation and Treatment  Follow-up  Status: Hold For -Scheduling  Requested for: 06NMF5404   Ordered; For: Breast cancer, left breast, Carcinoma of left breast, estrogen receptor positive, Encounter for gynecological examination without abnormal finding, Health Maintenance, Malignant neoplasm of upper-outer quadrant of left female breast; Ordered By: Kerry Manuel Performed:  Due: 62XNH0275  Malignant neoplasm of upper-outer quadrant of left female breast    · Anastrozole 1 MG Oral Tablet; TAKE 1 TABLET DAILY   Rx By: Kerry Manuel; Dispense: 90 Days ; #:1 X 90 Tablet Bottle; Refill: 1;Malignant neoplasm of upper-outer quadrant of left female breast; CLARISA = N; Verified Transmission to [employer]; Last Updated By: [employer]; 11/15/2017 3:36:16 PM    Discussion/Summary  Discussion Summary:   A 65year old postmenopausal woman with left breast cancer, stage IA, grade 2, % positive, HI 1-2% positive, HER-2 negative disease  She underwent lumpectomy with sentinel lymph node biopsy resulting in SHARYN  Her tumor was found to be low risk, based on the MammaPrint  Since July 2017, she has been on adjuvant hormonal therapy with anastrozole with no toxicity  Based on her symptomatology and physical examinations, she has no evidence recurrent disease  I recommended her to continue with anastrozole 1 mg once a day  She is in agreement with my recommendations  I will see her again in 6 months for routine follow-up        Chief Complaint  Chief Complaint: Chief Complaint:  The patient presents to the office today with Left breast cancer, stage IA(pT1b, pN0,M0) grade 2, % positive, OK 1-2% positive, HER-2 negative disease  MammaPrint low risk  Diagnosed in May 2017  Chief Complaint Free Text Note Form: Left breast cancer, stage IA(pT1b, pN0,M0) grade 2, % positive, OK 1-2% positive, HER-2 negative disease  MammaPrint low risk  Diagnosed in May 2017  History of Present Illness  HPI: A 62year old postmenopausal woman who was found to have abnormality in her left breast  Therefore, she underwent left breast biopsy in May 10, 2017, 9:00 position, 3 cm from the nipple on her left breast which showed invasive ductal carcinoma, grade 2  Subsequently, she underwent lumpectomy with sentinel lymph node biopsy by Dr Monalisa Bunch  She had 0 8Ã0 5Ã0 4 cm as well as 0 8Ã0 7Ã0 4 cm of invasive ductal carcinoma, grade 2  There was some lymphovascular invasion  One sentinel lymph node was negative for metastatic disease  This was % positive, OK 1-2% positive, HER-2 negative disease  Dr Monalisa Bunch sent her tumor tissue for MammaPrint, which came back low risk  She presented today with her daughter to discuss adjuvant treatment options  She feels well  She has no complaint of pain  She denied any respiratory symptoms  Her weight has been stable  She has osteopenia for which she was on oral bisphosphonate until a few years ago  Her most recent DEXA scan in April 2016 showed T score -2 4  She is up-to-date for colonoscopy  She has paternal grandmother as well as paternal aunt had a breast cancer  There is no first degree relative who has breast cancer  She is a lifetime never smoker  Her performance status is normal    Current Therapy: Adjuvant hormonal therapy with anastrozole since July 2017  Disease Status: SHARYN status post lumpectomy with sentinel lymph node biopsy  Interval History: A 36-year-old postmenopausal woman with stage IA left breast cancer, grade 2, % positive, OK 1-2% positive, HER2 negative disease   She underwent lumpectomy with sentinel lymph node biopsy, resulting in SHARYN  Her tumor was found to be low risk, based on the MammaPrint  Therefore, adjuvant chemotherapy was not indicated  Since July 2017, she has been on adjuvant hormonal therapy with anastrozole  She came in today for routine follow-up  She finished adjuvant radiation therapy in September 2017 with mild to moderate skin toxicity  She has no new complaint  She denied hot flashes  She also has no complaint of musculoskeletal symptoms  Her weight is stable  She has no respiratory symptoms, such as cough, sputum production or shortness of breast  Her performance status is normal       Review of Systems  Complete-Female:  Constitutional: No fever, no chills, feels well, no tiredness, no recent weight gain or weight loss  Eyes: No complaints of eye pain, no red eyes, no eyesight problems, no discharge, no dry eyes, no itching of eyes  ENT: no complaints of earache, no loss of hearing, no nose bleeds, no nasal discharge, no sore throat, no hoarseness  Cardiovascular: No complaints of slow heart rate, no fast heart rate, no chest pain, no palpitations, no leg claudication, no lower extremity edema  Respiratory: No complaints of shortness of breath, no wheezing, no cough, no SOB on exertion, no orthopnea, no PND  Gastrointestinal: No complaints of abdominal pain, no constipation, no nausea or vomiting, no diarrhea, no bloody stools  Genitourinary: No complaints of dysuria, no incontinence, no pelvic pain, no dysmenorrhea, no vaginal discharge or bleeding  Musculoskeletal: No complaints of arthralgias, no myalgias, no joint swelling or stiffness, no limb pain or swelling  Integumentary: No complaints of skin rash or lesions, no itching, no skin wounds, no breast pain or lump  Neurological: No complaints of headache, no confusion, no convulsions, no numbness, no dizziness or fainting, no tingling, no limb weakness, no difficulty walking    Psychiatric: Not suicidal, no sleep disturbance, no anxiety or depression, no change in personality, no emotional problems  Endocrine: No complaints of proptosis, no hot flashes, no muscle weakness, no deepening of the voice, no feelings of weakness  Hematologic/Lymphatic: No complaints of swollen glands, no swollen glands in the neck, does not bleed easily, does not bruise easily  ROS Reviewed:   ROS reviewed  Active Problems  1  Breast cancer, left breast (174 9) (C50 912)   2  Carcinoma of left breast, estrogen receptor positive (174 9,V86 0) (C50 912,Z17 0)   3  Encounter for gynecological examination without abnormal finding (V72 31) (Z01 419)   4  Irritable bowel syndrome (564 1) (K58 9)   5  Malignant neoplasm of upper-outer quadrant of left female breast (174 4) (C50 412)   6  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)   7  Osteopenia (733 90) (M85 80)   8  Seasonal allergies (477 9) (J30 2)    Past Medical History    1  History of Adenomatous endometrial hyperplasia (621 30) (N85 00)   2  History of Encounter for gynecological examination with Papanicolaou smear of cervix (V72 31) (Z01 419)   3  History of Encounter for routine gynecological examination (V72 31) (Z01 419)   4  History of Encounter for screening mammogram for malignant neoplasm of breast (V76 12) (Z12 31)   5  History of osteopenia (V13 59) (Z87 39)   6  History of radiation therapy (V15 3) (Z92 3)   7  History of sinusitis (V12 69) (Z87 09)   8  History of Mild cervical dysplasia (622 11) (N87 0)   9  History of Moderate dysplasia of cervix (APURVA II) (622 12) (N87 1)   10  Normal delivery (650) (O80,Z37 9)   11  History of Oral contraceptive prescribed (V25 01) (Z30 011)   12  History of Screening for human papillomavirus (HPV) (V73 81) (Z11 51)  Active Problems And Past Medical History Reviewed: The active problems and past medical history were reviewed and updated today  Surgical History  1  History of Appendectomy   2   History of Biopsy Breast Open   3  History of Biopsy Endometrial, Without Cervical Dilation   4  History of Cervical Loop Electrosurgical Excision (LEEP)   5  History of Cervical Surgery (Gyn) Laser Vaporization Of Transformation Zone   6  History of Colposcopy Cervix With Biopsy(S) With Endocervical Curettage   7  History of Left Breast Lumpectomy   8  History of [place] Lymph Node Biopsy   9  History of Tonsillectomy   10  History of Tubal Ligation  Surgical History Reviewed: The surgical history was reviewed and updated today  Family History  Mother    1  Family history of Nasopharyngeal carcinoma  Father    2  Family history of Coronary Artery Disease (V17 49)   3  Family history of malignant neoplasm of prostate (V16 42) (Z80 42)   4  Family history of Hypertension (V17 49)  Paternal Grandmother    11  Family history of malignant neoplasm of breast (V16 3) (Z80 3)  Paternal Aunt    10  Family history of malignant neoplasm of breast (V16 3) (Z80 3)  Family History Reviewed: The family history was reviewed and updated today  Social History     ·    · Exercising regularly (more than 90 min/week)   · Former smoker (V15 82) (H74 294)   · Living alone (V60 3) (Z60 2)   · No alcohol use   · No caffeine use   · Occupation  Social History Reviewed: The social history was reviewed and updated today  The social history was reviewed and is unchanged  Current Meds   1  Allegra Allergy TABS; TAKE 1 TABLET DAILY; Therapy: (Recorded:34Qsi7576) to Recorded   2  ALPRAZolam 0 25 MG Oral Tablet; TAKE 1 TABLET EVERY 12 HOURS AS NEEDED; Therapy: (Recorded:93Blc5556) to Recorded   3  Anastrozole 1 MG Oral Tablet; TAKE 1 TABLET DAILY; Therapy: 82DMW3703 to (22 419393)  Requested for: 24API5841; Last Rx:63Aka0803 Ordered   4  Calcium Citrate 500 MG CAPS; take 1 5 tablet twice daily with food; Therapy: (Recorded:81Sil6686) to Recorded   5  Daily Value Multivitamin TABS; TAKE 1 TABLET DAILY;  Therapy: (Recorded:34Uho4246) to Recorded   6  Hyoscyamine Sulfate 0 125 MG Sublingual Tablet Sublingual; PLACE 1 TABLET UNDER THE TONGUE  3 TIMES DAILY AS NEEDED; Therapy: (Recorded:17May2017) to Recorded   7  Ibuprofen 200 MG Oral Tablet; TAKE 1 TABLET 3-4 TIMES DAILY AS NEEDED; Therapy: (Recorded:17May2017) to Recorded   8  TH Vitamin B12 TABS; TAKE 1 TABLET DAILY AS DIRECTED; Therapy: [phone]) to Recorded   9  Triamterene-HCTZ 37 5-25 MG Oral Tablet; TAKE 1 TABLET EVERY OTHER DAY; Therapy: (Recorded:17May2017) to Recorded   10  Vitamin D3 5000 UNIT Oral Tablet; One daily; Therapy: (Recorded:17May2017) to Recorded  Medication List Reviewed: The medication list was reviewed and updated today  Allergies  1  Shellfish-derived Products  2  Dust   3  Grass   4  Mold   5  Shellfish    Vitals  Vital Signs    Recorded: 13EVO7167 03:19PM   Temperature 98 6 F   Heart Rate 79   Respiration 16   Systolic 861   Diastolic 78   Height 5 ft 4 in   Weight 138 lb    BMI Calculated 23 69   BSA Calculated 1 67   O2 Saturation 95       Physical Exam   Constitutional  General appearance: No acute distress, well appearing and well nourished  Eyes  Conjunctiva and lids: No swelling, erythema or discharge  Pupils and irises: Equal, round and reactive to light  Ears, Nose, Mouth, and Throat  External inspection of ears and nose: Normal    Otoscopic examination: Tympanic membranes translucent with normal light reflex  Canals patent without erythema  Nasal mucosa, septum, and turbinates: Normal without edema or erythema  Oropharynx: Normal with no erythema, edema, exudate or lesions  Pulmonary  Respiratory effort: No increased work of breathing or signs of respiratory distress  Auscultation of lungs: Clear to auscultation  Cardiovascular  Palpation of heart: Normal PMI, no thrills  Auscultation of heart: Normal rate and rhythm, normal S1 and S2, without murmurs     Examination of extremities for edema and/or varicosities: Normal    Carotid pulses: Normal    Abdomen  Abdomen: Non-tender, no masses  Liver and spleen: No hepatomegaly or splenomegaly  Lymphatic  Palpation of lymph nodes in neck: No lymphadenopathy  Musculoskeletal  Gait and station: Normal    Digits and nails: Normal without clubbing or cyanosis  Inspection/palpation of joints, bones, and muscles: Normal    Skin  Skin and subcutaneous tissue: Normal without rashes or lesions  Neurologic  Cranial nerves: Cranial nerves 2-12 intact  Reflexes: 2+ and symmetric  Sensation: No sensory loss  Psychiatric  Orientation to person, place, and time: Normal    Mood and affect: Normal    Additional Exam:  Breast exam; lumpectomy scar upper inner quadrant of the left breast with no palpable abnormalities  Right breast exam is negative  ECOG 0       Results/Data  Results Form:   Results  Pathology 0 8Ã0 5Ã0 4 cm as well as 0 8Ã0 7Ã0 4 cm of invasive ductal carcinoma, grade 2  There is a lymphovascular invasion present  One sentinel lymph node was negative for metastatic disease  % positive, [place] 1-2% positive, HER-2 negative disease  IA(pT1b, pN0,M0)  MammaPrint low risk  Radiology Chest x-ray was negative for bony disease  Lab Results CBC and CMP are within normal limits  Future Appointments    Date/Time Provider Specialty Site   05/03/2018 03:45 PM Brianna Ribera CRNP Surgical Oncology [facility]   09/17/2018 05:00 PM SLOAN Marinelli   Obstetrics/Gynecology [facility]       Signatures   Electronically signed by : SLOAN Leon ; Nov 15 2017  3:38PM EST                       (Author)

## 2018-01-10 NOTE — PROGRESS NOTES
Discussion/Summary  Social Work-Discussion Summary [facility]: Patient is being seen for a distress screenning assessment  Received and reviewed Pt's Distress Thermometer completed by Pt on 7/17/17 in the [address] [place] [address] Floor Oncology office  Pt rated her distress at a 1/10 and denied having any psychosocial problems  Based upon Pt's low distress score, a social work follow up is not indicated  If Pt expresses psychosocial needs, Cancer Counselor is available to provide counseling and intervention         Signatures   Electronically signed by : Jenifer Olson, [address]; Jul 20 2017 10:56AM EST                       (Author)

## 2018-01-10 NOTE — MISCELLANEOUS
Message   Recorded as Task   Date: 09/15/2017 09:21 AM, Created By: Sarath Maddox   Task Name: Miscellaneous   Assigned To: Bonita Pettit   Regarding Patient: Alexandria Fat, Status: In Progress   Comment:    Divya Campbell - 15 Sep 2017 9:21 AM     TASK CREATED  Caller: Self; [phone] (Mobile Phone)  pt called in regards to script for dexa scan and mammo   orders not in chart please mail to pt   Malorie Headley - 15 Sep 2017 9:26 AM     TASK IN PROGRESS   Malorie Headley - 15 Sep 2017 9:34 AM     TASK EDITED  lm for pt tcb re shahla slip due to hx breast cancer   Jayna Melvin - 18 Sep 2017 9:04 AM     TASK EDITED   Jayna Melvin - 18 Sep 2017 9:04 AM     TASK IN PROGRESS   Jayna Mlevin - 18 Sep 2017 9:10 AM     TASK EDITED  [facility] for pt to cb regarding the mammo order  Pt has cancer in left breast  Need to know if she will be following up the breast surgeon  ext: Magda Matthews - 19 Sep 2017 10:52 AM     TASK EDITED   Hector Ludwig - 19 Sep 2017 10:52 AM     TASK IN PROGRESS   Tiffanie Caceres - 19 Sep 2017 11:01 AM     TASK EDITED  Voice msg left for patient  Dexa scan RX will be sent to her, to be done after April 2018  An RX was in the computer for a mammogram from us, however she should follow up with her breast surgeon for the next few years unless she has been released from his care  A mammo RX was sent to her in case she needs it  Active Problems    1  Breast cancer, left breast (174 9) (C50 912)   2  Encounter for gynecological examination without abnormal finding (V72 31) (Z01 419)   3  Irritable bowel syndrome (564 1) (K58 9)   4  Malignant neoplasm of upper-outer quadrant of left female breast (174 4) (C50 412)   5  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 01)   6  Osteopenia (733 90) (M85 80)   7  Seasonal allergies (477 9) (J30 2)    Current Meds   1  Allegra Allergy TABS (Fexofenadine HCl); TAKE 1 TABLET DAILY; Therapy: (Recorded:46Kok1189) to Recorded   2  ALPRAZolam 0 25 MG Oral Tablet; TAKE 1 TABLET EVERY 12 HOURS AS NEEDED; Therapy: (Recorded:17May2017) to Recorded   3  Anastrozole 1 MG Oral Tablet; TAKE 1 TABLET DAILY; Therapy: 42FWE7368 to (22 [id number])  Requested for: 84BMP4935; Last   Rx:51Ngt2332 Ordered   4  Calcium Citrate 500 MG CAPS; take 1 5 tablet twice daily with food; Therapy: (Recorded:31Bhx2193) to Recorded   5  Daily Value Multivitamin TABS; TAKE 1 TABLET DAILY; Therapy: (Recorded:17May2017) to Recorded   6  Hyoscyamine Sulfate 0 125 MG Sublingual Tablet Sublingual; PLACE 1 TABLET   UNDER THE TONGUE  3 TIMES DAILY AS NEEDED; Therapy: (Recorded:17May2017) to Recorded   7  Ibuprofen 200 MG Oral Tablet; TAKE 1 TABLET 3-4 TIMES DAILY AS NEEDED; Therapy: (Recorded:17May2017) to Recorded   8  Triamterene-HCTZ 37 5-25 MG Oral Tablet; TAKE 1 TABLET EVERY OTHER DAY; Therapy: (Recorded:17May2017) to Recorded   9  Vitamin D3 5000 UNIT Oral Tablet; One daily; Therapy: (Recorded:17May2017) to Recorded    Allergies    1  Shellfish-derived Products    2  Dust   3  Grass   4  Mold   5  Shellfish    Plan  Malignant neoplasm of upper-outer quadrant of left female breast    · MAMMO SCREENING BILATERAL W 3D & CAD; Status:Hold For - Scheduling,Exact Date;   Requested for:After 47SRK3481;     Signatures   Electronically signed by : Brie Seo, ; Sep 19 2017 11:07AM EST                       (Author)

## 2018-01-10 NOTE — PROGRESS NOTES
Active Problems    1  Abnormal mammogram (793 80) (R92 8)   2  Encounter for gynecological examination with Papanicolaou smear of cervix (V72 31)   (Z01 419)   3  Encounter for gynecological examination without abnormal finding (V72 31) (Z01 419)   4  Encounter for routine gynecological examination (V72 31) (Z01 419)   5  Encounter for screening mammogram for malignant neoplasm of breast (V76 12)   (Z12 31)   6  MeniÃ¨re's disease (386 00) (H81 09)   7  Osteoporosis (733 00) (M81 0)   8  Screening for human papillomavirus (HPV) (V73 81) (Z11 51)    Current Meds   1  Alendronate Sodium 70 MG Oral Tablet (Fosamax); TAKE 1 TABLET ONCE WEEKLY; Therapy: 61SQG7697 to (Evaluate:80Zzi4327)  Requested for: 47Hvq3136; Last   Rx:70Bps3750 Ordered   2  Calcium 600+D 600-400 MG-UNIT Oral Tablet Recorded   3  [employer] Vitamin D 1000 UNIT CAPS Recorded   4  Triamterene-HCTZ 37 5-25 MG Oral Tablet; Therapy: 26ZUZ7184 to (Last Rx:05Jan2012)  Requested for: 77KRE7835 Ordered    Allergies    1  Shellfish-derived Products    2  Dust   3  Grass   4  Mold   5  Shellfish    Plan  Abnormal mammogram    · US GUIDED BREAST BIOPSY LEFT COMPLETE; Status:Active - Retrospective By  Protocol Authorization; Requested WYF:[id number]; Future Appointments    Date/Time Provider Specialty Site   09/14/2017 05:00 PM SLOAN Fowler  Obstetrics/Gynecology [facility] OB     Message   Recorded as Task   Date: 05/05/2017 10:38 AM, Created By: Ronnie Delgado   Task Name: Result Follow Up   Assigned To: Priya Kennedy   Regarding Patient: Ameliebrina Bal, Status: In Progress   Solomon Ingram - 05 May 2017 10:38 AM     TASK CREATED  check with patient about follow up for recommended breast biopsy   Prema Hoffman - 05 May 2017 10:41 AM     TASK IN PROGRESS   Prema Hoffman - 05 May 2017 10:49 AM     TASK REPLIED TO: Previously Assigned To Prema Hoffman  call the pt no answer left call bk number for pt to call me bk  Edis Preciado - 05 May 2017 10:50 AM     TASK REPLIED TO: Previously Assigned To [employer]   Electronically signed by : Kirsty Lerner MA; May  5 2017  1:24PM EST                       (Author)

## 2018-01-10 NOTE — MISCELLANEOUS
Message   Recorded as Task   Date: 04/15/2016 01:52 PM, Created By: Kade Obrien   Task Name: Call Back   Assigned To: Chelo Jacobsphoebe   Regarding Patient: Fauzia Hargrove, Status: In Progress   Comment:    Divya Campbell - 15 Apr 2016 1:52 PM     TASK CREATED  Caller: Self; [phone] (Home)  pt called requesting bone density results please advise pt @ [phone] - 15 Apr 2016 1:53 PM     TASK IN PROGRESS   DorisJan - 15 Apr 2016 2:11 PM     TASK EDITED  returned pt's p c   - per dr Tyron Jimenez, advised pt consult with her family   pt verbalized understanding  Active Problems    1  Encounter for gynecological examination with Papanicolaou smear of cervix   (V72 31,V76 2) (L06 914,J46 8)   2  Encounter for routine gynecological examination (V72 31) (Z01 419)   3  Encounter for screening mammogram for malignant neoplasm of breast (V76 12)   (Z12 31)   4  MeniÃ¨re's disease (386 00) (H81 09)   5  Osteoporosis (733 00) (M81 0)   6  Screening for human papillomavirus (HPV) (V73 81) (Z11 51)    Current Meds   1  Alendronate Sodium 70 MG Oral Tablet (Fosamax); TAKE 1 TABLET ONCE WEEKLY; Therapy: 06DZR7275 to (Evaluate:64Lkh6535)  Requested for: 51Pct1700; Last   Rx:01Itw5848 Ordered   2  Calcium 600+D 600-400 MG-UNIT Oral Tablet Recorded   3  [employer] Vitamin D 1000 UNIT CAPS Recorded   4  Triamterene-HCTZ 37 5-25 MG Oral Tablet; Therapy: 33CYZ9993 to (Last Rx:05Jan2012)  Requested for: 05WKF7031 Ordered    Allergies    1  Shellfish-derived Products    2  Dust   3  Grass   4  Mold   5   Shellfish    Signatures   Electronically signed by : Fran Schuler RN; Apr 15 2016  2:11PM EST                       (Author)

## 2018-01-11 NOTE — MISCELLANEOUS
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)   6  Osteopenia (733 90) (M85 80)   7  Seasonal allergies (477 9) (J30 2)    Current Meds   1  Allegra Allergy TABS (Fexofenadine HCl); TAKE 1 TABLET DAILY; Therapy: (Recorded:67Pzq9329) to Recorded   2  ALPRAZolam 0 25 MG Oral Tablet; TAKE 1 TABLET EVERY 12 HOURS AS NEEDED; Therapy: (Recorded:17May2017) to Recorded   3  Anastrozole 1 MG Oral Tablet; TAKE 1 TABLET DAILY; Therapy: 83DWV0385 to (22 181003)  Requested for: 10GZU0296; Last   Rx:82Zfu6972 Ordered   4  Calcium Citrate 500 MG CAPS; take 1 5 tablet twice daily with food; Therapy: (Recorded:14Hcv3128) to Recorded   5  Daily Value Multivitamin TABS; TAKE 1 TABLET DAILY; Therapy: (Recorded:17May2017) to Recorded   6  Hyoscyamine Sulfate 0 125 MG Sublingual Tablet Sublingual; PLACE 1 TABLET   UNDER THE TONGUE  3 TIMES DAILY AS NEEDED; Therapy: (Recorded:17May2017) to Recorded   7  Ibuprofen 200 MG Oral Tablet; TAKE 1 TABLET 3-4 TIMES DAILY AS NEEDED; Therapy: (Recorded:17May2017) to Recorded   8  Triamterene-HCTZ 37 5-25 MG Oral Tablet; TAKE 1 TABLET EVERY OTHER DAY; Therapy: (Recorded:17May2017) to Recorded   9  Vitamin D3 5000 UNIT Oral Tablet; One daily; Therapy: (Recorded:90Ejd7629) to Recorded    Allergies    1  Shellfish-derived Products    2  Dust   3  Grass   4  Mold   5   Shellfish    Plan  Health Maintenance    · * DXA BONE DENSITY SPINE HIP AND PELVIS; Status:Hold For -  Scheduling,Retrospective Authorization; Requested for:30Apr2018; Malignant neoplasm of upper-outer quadrant of left female breast    · MAMMO SCREENING BILATERAL W 3D & CAD; Status:Hold For -  Scheduling,Retrospective Authorization;  Requested for:30Apr2018;     Signatures   Electronically signed by : Hang Cornejo RN; Sep 19 2017  9:10AM EST                       (Author)

## 2018-01-12 VITALS
TEMPERATURE: 98.2 F | BODY MASS INDEX: 22.71 KG/M2 | WEIGHT: 136.31 LBS | OXYGEN SATURATION: 98 % | RESPIRATION RATE: 18 BRPM | SYSTOLIC BLOOD PRESSURE: 148 MMHG | DIASTOLIC BLOOD PRESSURE: 78 MMHG | HEART RATE: 96 BPM | HEIGHT: 65 IN

## 2018-01-12 NOTE — MISCELLANEOUS
Message   Recorded as Task   Date: 05/05/2017 08:41 AM, Created By: Connie Landin   Task Name: Follow Up   Assigned To: George Fee   Regarding Patient: April Blend, Status: In Progress   Comment:    Jeanna Hanks - 05 May 2017 8:41 AM     TASK CREATED  Pt is aware of biopsy recommendation and has an appointment on 5/10/17 for a left u/s guided breast biopsy  Please enter an order into Allscripts for the biopsy  Thank you! Shahzad Dress - 05 May 2017 8:46 AM     TASK IN PROGRESS   Shahzad Dress - 05 May 2017 8:47 AM     TASK EDITED  order in allscripts        Active Problems    1  Abnormal mammogram (793 80) (R92 8)   2  Encounter for gynecological examination with Papanicolaou smear of cervix (V72 31)   (Z01 419)   3  Encounter for gynecological examination without abnormal finding (V72 31) (Z01 419)   4  Encounter for routine gynecological examination (V72 31) (Z01 419)   5  Encounter for screening mammogram for malignant neoplasm of breast (V76 12)   (Z12 31)   6  MeniÃ¨re's disease (386 00) (H81 09)   7  Osteoporosis (733 00) (M81 0)   8  Screening for human papillomavirus (HPV) (V73 81) (Z11 51)    Current Meds   1  Alendronate Sodium 70 MG Oral Tablet (Fosamax); TAKE 1 TABLET ONCE WEEKLY; Therapy: 35GEI5746 to (Evaluate:90Vdq0453)  Requested for: 59Ybv3951; Last   Rx:81Pym3727 Ordered   2  Calcium 600+D 600-400 MG-UNIT Oral Tablet Recorded   3  [facility] Vitamin D 1000 UNIT CAPS Recorded   4  Triamterene-HCTZ 37 5-25 MG Oral Tablet; Therapy: 07DCP3752 to (Last Rx:05Jan2012)  Requested for: 55HPF1785 Ordered    Allergies    1  Shellfish-derived Products    2  Dust   3  Grass   4  Mold   5  Shellfish    Plan  Abnormal mammogram    · US GUIDED BREAST BIOPSY LEFT COMPLETE; Status:Hold For -  Scheduling,Retrospective By Protocol Authorization;  Requested for:91Dxq3557;     Signatures   Electronically signed by : Azalia Goodson, ; May  5 2017  8:47AM EST                       (Author)

## 2018-01-13 VITALS
SYSTOLIC BLOOD PRESSURE: 122 MMHG | BODY MASS INDEX: 22.88 KG/M2 | WEIGHT: 134 LBS | DIASTOLIC BLOOD PRESSURE: 64 MMHG | HEIGHT: 64 IN

## 2018-01-14 VITALS
SYSTOLIC BLOOD PRESSURE: 120 MMHG | BODY MASS INDEX: 23.24 KG/M2 | HEIGHT: 64 IN | HEART RATE: 68 BPM | RESPIRATION RATE: 16 BRPM | TEMPERATURE: 97.9 F | OXYGEN SATURATION: 99 % | DIASTOLIC BLOOD PRESSURE: 70 MMHG | WEIGHT: 136.13 LBS

## 2018-01-14 VITALS
OXYGEN SATURATION: 98 % | BODY MASS INDEX: 22.55 KG/M2 | DIASTOLIC BLOOD PRESSURE: 86 MMHG | SYSTOLIC BLOOD PRESSURE: 144 MMHG | WEIGHT: 135.31 LBS | TEMPERATURE: 98.6 F | RESPIRATION RATE: 18 BRPM | HEART RATE: 72 BPM | HEIGHT: 65 IN

## 2018-01-14 VITALS
HEART RATE: 79 BPM | RESPIRATION RATE: 16 BRPM | WEIGHT: 138 LBS | DIASTOLIC BLOOD PRESSURE: 78 MMHG | HEIGHT: 64 IN | TEMPERATURE: 98.6 F | OXYGEN SATURATION: 95 % | SYSTOLIC BLOOD PRESSURE: 114 MMHG | BODY MASS INDEX: 23.56 KG/M2

## 2018-01-14 VITALS
HEIGHT: 65 IN | SYSTOLIC BLOOD PRESSURE: 138 MMHG | HEART RATE: 100 BPM | WEIGHT: 136 LBS | RESPIRATION RATE: 18 BRPM | TEMPERATURE: 100 F | BODY MASS INDEX: 22.66 KG/M2 | DIASTOLIC BLOOD PRESSURE: 80 MMHG | OXYGEN SATURATION: 97 %

## 2018-01-14 NOTE — MISCELLANEOUS
Message   Recorded as Task   Date: 09/19/2017 02:36 PM, Created By: Radha Ferrer   Task Name: Call Back   Assigned To: Elen Garcia   Regarding Patient: Hany Ham, Status: Active   Comment:    Radha Ferrer - 19 Sep 2017 2:36 PM     TASK CREATED  Pt called office today, left voicemail message  Pt states lab orders for mammogram and DEXA scan have not arrived in the mail as of yet  Pt states she was told by [address] [place] [address] staff that her lab orders would arrive in the mail  Pt's provider is Dr Darrick Rosenbaum  This MA spoke directly with Dr Nishi Ray assistant, Natty Paz informed this MA that she left a detailed voicemail message on Pt's phone stating that Pt's DEXA scan prescription will be sent to her, to be done after April 2018  Natty Paz further informed this MA that she further stated on message that a prescription was entered in Allscripts for a mammogram, however, Pt should follow up with her breast surgeon for the next few years unless Pt has been released from breast surgeon's care (prescription for mammogram was sent to Pt in case she needs it)  Natty Paz informed this MA that Pt could contact her directly at office if she has any questions/concerns  This MA left voicemail message for Pt informing Pt that she can contact Natty Paz (MA) with any questions she may have @ [phone]  Active Problems    1  Breast cancer, left breast (174 9) (C50 912)   2  Encounter for gynecological examination without abnormal finding (V72 31) (Z01 419)   3  Irritable bowel syndrome (564 1) (K58 9)   4  Malignant neoplasm of upper-outer quadrant of left female breast (174 4) (C50 412)   5  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)   6  Osteopenia (733 90) (M85 80)   7  Seasonal allergies (477 9) (J30 2)    Current Meds   1  Allegra Allergy TABS (Fexofenadine HCl); TAKE 1 TABLET DAILY; Therapy: (Recorded:27Xuf8356) to Recorded   2   ALPRAZolam 0 25 MG Oral Tablet; TAKE 1 TABLET EVERY 12 HOURS AS NEEDED; Therapy: (Recorded:17May2017) to Recorded   3  Anastrozole 1 MG Oral Tablet; TAKE 1 TABLET DAILY; Therapy: 29CZX9370 to ([phone])  Requested for: 25ZYG9131; Last   Rx:80Esq1163 Ordered   4  Calcium Citrate 500 MG CAPS; take 1 5 tablet twice daily with food; Therapy: (Recorded:14Jul2017) to Recorded   5  Daily Value Multivitamin TABS; TAKE 1 TABLET DAILY; Therapy: (Recorded:17May2017) to Recorded   6  Hyoscyamine Sulfate 0 125 MG Sublingual Tablet Sublingual; PLACE 1 TABLET   UNDER THE TONGUE  3 TIMES DAILY AS NEEDED; Therapy: (Recorded:17May2017) to Recorded   7  Ibuprofen 200 MG Oral Tablet; TAKE 1 TABLET 3-4 TIMES DAILY AS NEEDED; Therapy: (Recorded:17May2017) to Recorded   8  Triamterene-HCTZ 37 5-25 MG Oral Tablet; TAKE 1 TABLET EVERY OTHER DAY; Therapy: (Recorded:17May2017) to Recorded   9  Vitamin D3 5000 UNIT Oral Tablet; One daily; Therapy: (Recorded:17May2017) to Recorded    Allergies    1  Shellfish-derived Products    2  Dust   3  Grass   4  Mold   5   Shellfish    Signatures   Electronically signed by : Tatyana Byrd MA; Sep 19 2017  2:36PM EST                       (Author)

## 2018-01-15 VITALS
RESPIRATION RATE: 18 BRPM | DIASTOLIC BLOOD PRESSURE: 90 MMHG | WEIGHT: 136 LBS | HEART RATE: 91 BPM | SYSTOLIC BLOOD PRESSURE: 140 MMHG | BODY MASS INDEX: 22.66 KG/M2 | TEMPERATURE: 98.9 F | HEIGHT: 65 IN | OXYGEN SATURATION: 98 %

## 2018-01-16 NOTE — MISCELLANEOUS
Message   Recorded as Task   Date: 05/05/2017 10:38 AM, Created By: Abbi Pena   Task Name: Result Follow Up   Assigned To: Adelaida Brower   Regarding Patient: Shayy Potter, Status: In Progress   Fredrick Valle - 05 May 2017 10:38 AM     TASK CREATED  check with patient about follow up for recommended breast biopsy   Joanie Chao - 05 May 2017 10:41 AM     TASK IN PROGRESS   Joanie Chao - 05 May 2017 10:49 AM     TASK REPLIED TO: Previously Assigned To Andriette Houston  call the pt no answer left call bk number for pt to call me bk  Joanie Chao - 05 May 2017 10:50 AM     TASK REPLIED TO: Previously Assigned To Alannah Silvia - 05 May 2017 1:30 PM     TASK EDITED  Pt has her breast biopsy 5/10  RX was placed in the computer  Active Problems    1  Abnormal mammogram (793 80) (R92 8)   2  Encounter for gynecological examination with Papanicolaou smear of cervix (V72 31)   (Z01 419)   3  Encounter for gynecological examination without abnormal finding (V72 31) (Z01 419)   4  Encounter for routine gynecological examination (V72 31) (Z01 419)   5  Encounter for screening mammogram for malignant neoplasm of breast (V76 12)   (Z12 31)   6  MeniÃ¨re's disease (386 00) (H81 09)   7  Osteoporosis (733 00) (M81 0)   8  Screening for human papillomavirus (HPV) (V73 81) (Z11 51)    Current Meds   1  Alendronate Sodium 70 MG Oral Tablet (Fosamax); TAKE 1 TABLET ONCE WEEKLY; Therapy: 04OAC8449 to (Evaluate:38Uge0017)  Requested for: 51Edo7692; Last   Rx:49Upe4680 Ordered   2  Calcium 600+D 600-400 MG-UNIT Oral Tablet Recorded   3  CVS Vitamin D 1000 UNIT CAPS Recorded   4  Triamterene-HCTZ 37 5-25 MG Oral Tablet; Therapy: 28VJW2778 to (Last Rx:05Jan2012)  Requested for: 45HJL3923 Ordered    Allergies    1  Shellfish-derived Products    2  Dust   3  Grass   4  Mold   5   Shellfish    Signatures   Electronically signed by : Catalina Li, ; May  5 2017  1:30PM EST (Author)

## 2018-01-22 VITALS
SYSTOLIC BLOOD PRESSURE: 124 MMHG | BODY MASS INDEX: 22.88 KG/M2 | RESPIRATION RATE: 16 BRPM | DIASTOLIC BLOOD PRESSURE: 78 MMHG | HEART RATE: 88 BPM | WEIGHT: 134 LBS | TEMPERATURE: 98.9 F | HEIGHT: 64 IN

## 2018-01-23 VITALS
BODY MASS INDEX: 23.24 KG/M2 | WEIGHT: 136.13 LBS | DIASTOLIC BLOOD PRESSURE: 70 MMHG | OXYGEN SATURATION: 99 % | RESPIRATION RATE: 16 BRPM | SYSTOLIC BLOOD PRESSURE: 120 MMHG | TEMPERATURE: 97.9 F | HEIGHT: 64 IN | HEART RATE: 68 BPM

## 2018-04-14 DIAGNOSIS — C50.912 MALIGNANT NEOPLASM OF LEFT FEMALE BREAST (HCC): ICD-10-CM

## 2018-04-14 DIAGNOSIS — Z00.00 ENCOUNTER FOR GENERAL ADULT MEDICAL EXAMINATION WITHOUT ABNORMAL FINDINGS: ICD-10-CM

## 2018-04-20 DIAGNOSIS — C50.412 MALIGNANT NEOPLASM OF UPPER-OUTER QUADRANT OF LEFT FEMALE BREAST (HCC): ICD-10-CM

## 2018-04-20 DIAGNOSIS — C50.912 MALIGNANT NEOPLASM OF LEFT FEMALE BREAST (HCC): ICD-10-CM

## 2018-04-23 ENCOUNTER — TRANSCRIBE ORDERS (OUTPATIENT)
Dept: RADIOLOGY | Facility: CLINIC | Age: 63
End: 2018-04-23

## 2018-04-26 ENCOUNTER — HOSPITAL ENCOUNTER (OUTPATIENT)
Dept: RADIOLOGY | Age: 63
Discharge: HOME | End: 2018-04-26
Payer: COMMERCIAL

## 2018-04-26 DIAGNOSIS — M85.80 OSTEOPENIA, UNSPECIFIED LOCATION: ICD-10-CM

## 2018-04-26 PROCEDURE — 77080 DXA BONE DENSITY AXIAL: CPT

## 2018-04-27 ENCOUNTER — HOSPITAL ENCOUNTER (OUTPATIENT)
Dept: MAMMOGRAPHY | Facility: CLINIC | Age: 63
Discharge: HOME | End: 2018-04-27
Payer: COMMERCIAL

## 2018-04-27 ENCOUNTER — TRANSCRIBE ORDERS (OUTPATIENT)
Dept: ADMINISTRATIVE | Facility: HOSPITAL | Age: 63
End: 2018-04-27

## 2018-04-27 DIAGNOSIS — N63.0 LUMP IN FEMALE BREAST: Primary | ICD-10-CM

## 2018-04-27 DIAGNOSIS — C50.912 MALIGNANT NEOPLASM OF LEFT FEMALE BREAST (HCC): ICD-10-CM

## 2018-04-27 PROBLEM — C50.412 MALIGNANT NEOPLASM OF UPPER-OUTER QUADRANT OF LEFT FEMALE BREAST (HCC): Status: ACTIVE | Noted: 2017-05-17

## 2018-04-27 PROCEDURE — G0279 TOMOSYNTHESIS, MAMMO: HCPCS

## 2018-04-27 PROCEDURE — 77066 DX MAMMO INCL CAD BI: CPT

## 2018-04-27 RX ORDER — ANASTROZOLE 1 MG/1
1 TABLET ORAL DAILY
COMMUNITY
Start: 2017-07-13 | End: 2018-05-16 | Stop reason: SDUPTHER

## 2018-04-30 ENCOUNTER — TELEPHONE (OUTPATIENT)
Dept: OBGYN CLINIC | Facility: CLINIC | Age: 63
End: 2018-04-30

## 2018-04-30 DIAGNOSIS — R92.8 ABNORMAL MAMMOGRAM: Primary | ICD-10-CM

## 2018-04-30 PROBLEM — Z17.0 CARCINOMA OF LEFT BREAST, ESTROGEN RECEPTOR POSITIVE (HCC): Status: ACTIVE | Noted: 2017-09-21

## 2018-04-30 PROBLEM — J30.2 SEASONAL ALLERGIES: Status: ACTIVE | Noted: 2017-05-17

## 2018-04-30 PROBLEM — H81.01 MENIERE'S DISEASE OF RIGHT EAR: Status: ACTIVE | Noted: 2017-07-14

## 2018-04-30 PROBLEM — M85.80 OSTEOPENIA: Status: ACTIVE | Noted: 2017-07-14

## 2018-04-30 PROBLEM — C50.912 CARCINOMA OF LEFT BREAST, ESTROGEN RECEPTOR POSITIVE (HCC): Status: ACTIVE | Noted: 2017-09-21

## 2018-04-30 PROBLEM — K58.9 IRRITABLE BOWEL SYNDROME: Status: ACTIVE | Noted: 2017-05-17

## 2018-04-30 PROBLEM — Z17.0 MALIGNANT NEOPLASM OF UPPER-OUTER QUADRANT OF LEFT BREAST IN FEMALE, ESTROGEN RECEPTOR POSITIVE (HCC): Status: ACTIVE | Noted: 2017-05-17

## 2018-04-30 NOTE — TELEPHONE ENCOUNTER
Pt needs 6 mo f/u diag r mammo   Slip entered  Pt aware  Pt said she had dexa result sent to her hematologist, Dr Tray Pandey? Pt had femoral neck result of - 2 5   In 2016 it was - 2 4  All other results stable  Pt mentioned prolia - not sure if you feel she needs it  Dexa  looks pretty stable  She had previous breast cancer  [phone]  Thank you    Not sure if I ever ent this to you on Mon  My error! She needs your input about her dexa   Thanks

## 2018-05-01 ENCOUNTER — APPOINTMENT (OUTPATIENT)
Dept: RADIATION ONCOLOGY | Facility: HOSPITAL | Age: 63
End: 2018-05-01
Attending: RADIOLOGY
Payer: COMMERCIAL

## 2018-05-01 ENCOUNTER — RADIATION ONCOLOGY FOLLOW-UP (OUTPATIENT)
Dept: RADIATION ONCOLOGY | Facility: HOSPITAL | Age: 63
End: 2018-05-01

## 2018-05-01 VITALS
BODY MASS INDEX: 22.76 KG/M2 | WEIGHT: 136.6 LBS | OXYGEN SATURATION: 98 % | SYSTOLIC BLOOD PRESSURE: 118 MMHG | HEART RATE: 76 BPM | DIASTOLIC BLOOD PRESSURE: 60 MMHG | TEMPERATURE: 98.1 F | HEIGHT: 65 IN | RESPIRATION RATE: 16 BRPM

## 2018-05-01 DIAGNOSIS — C50.412 MALIGNANT NEOPLASM OF UPPER-OUTER QUADRANT OF LEFT BREAST IN FEMALE, ESTROGEN RECEPTOR POSITIVE (HCC): Primary | ICD-10-CM

## 2018-05-01 DIAGNOSIS — Z17.0 MALIGNANT NEOPLASM OF UPPER-OUTER QUADRANT OF LEFT BREAST IN FEMALE, ESTROGEN RECEPTOR POSITIVE (HCC): Primary | ICD-10-CM

## 2018-05-01 PROCEDURE — 99215 OFFICE O/P EST HI 40 MIN: CPT | Performed by: RADIOLOGY

## 2018-05-01 RX ORDER — ALPRAZOLAM 0.25 MG/1
TABLET ORAL
COMMUNITY
End: 2018-11-14 | Stop reason: HOSPADM

## 2018-05-01 RX ORDER — RANITIDINE 150 MG/1
150 CAPSULE ORAL 2 TIMES DAILY
COMMUNITY
End: 2018-06-01 | Stop reason: ALTCHOICE

## 2018-05-01 RX ORDER — HYOSCYAMINE SULFATE 0.125 MG
0.12 TABLET ORAL EVERY 4 HOURS PRN
COMMUNITY
End: 2018-11-14 | Stop reason: HOSPADM

## 2018-05-01 NOTE — PROGRESS NOTES
Follow-up - Radiation Oncology   Magdy Lopez 1955 65 y o  female [id number]      History of Present Illness   Cancer Staging  See Below    Magdy Lopez is a 65y o  year old female with a history a left breast carcinoma with stage IA, pT1b, pN0, M0 grade 2, ER positive at 100%, NJ positive at 1-2% and HER-2/yanni negative invasive mammary carcinoma  She also had multiple foci of ductal carcinoma in situ  She is status post lumpectomy with negative margins and had a negative sentinel lymph node procedure  Her MammaPrint came back low risk  She was seen by Dr Rufus Cranker and recommendations were made for adjuvant hormonal therapy with anastrozole daily  In order to complete her breast conservation management, we recommended radiation therapy to the entire left breast that was completed on 9/5/17 9/14/17 FU Dr Valentine Cm: Yearly GYN exam done-negative  Last seen on 10/3/17  Interval History:  4/27/18 Bilateral diagnostic mammogeam  IMPRESSION:1  [place] are a few calcifications in the upper medial  right breast, for which six-month follow-up right diagnostic   mammogram is recommended to ensure stability  2   No mammographic evidence of malignancy on the left      ACR BI-RADS® Assessments: BiRad:3 - Probably Benign - Right     Recommendation:  Follow-up diagnostic mammogram of the right breast in 6 months      5/3/18 Surgical Oncology follow up     5/16/18 Dr Rufus Cranker follow-up     10/29/18 Right diagnostic Mammogram scheduled     Screening  Tobacco  Current tobacco user: yes  If yes, brief counseling provided: NA     Hypertension  Hypertension screening performed: yes  Normotensive:  yes  If no, referred to PCP: n/a     Depression Screening  Screened for depression using PHQ-2: no     Screened for depression using PHQ-9:  yes  Screening positive or negative:  negative  If score >4, was any of the following actions taken?    Additional evaluation for depression, suicide risk assesment, referral to PCP or psychiatry, medication started:  no, n/a     Advanced Care Planning for Patients >65 years  Advanced Care Planning Discussed:  yes  Patient named surrogate decision maker or care plan in chart:  no     There are no preventive care reminders to display for this patient  Historical Information      Malignant ne[facility] of upper-outer quadrant of left breast in female, [facility]    5/10/2017 Initial Diagnosis     Malignant neoplasm of upper-outer quadrant of left female breast ([facility] )         5/10/2017 Biopsy     Left breast biopsy  Invasive ductal carcinoma, Grade 2   % positive, NH 1-2% positive, HER2 negative         5/30/2017 Surgery     Left lumpectomy  2 foci of Grade 2 invasive ductal carcinoma with DCIS present         5/30/2017 -  Cancer Staged     Stage IA pT1b(m) pN0         7/2017 -  Hormone Therapy     Anastrozole - Dr Zahra Obrien         7/25/2017 - 9/5/2017 Radiation     Left breast   5000 cGy to the entire breast and a total dose of 6000 cGy to the lumpectomy cavity  Past Medical History:   Diagnosis Date    Acid reflux     [facility]     History of external beam radiation therapy     Irritable bowel syndrome     Meniere's disease     Osteopenia of the elderly     PONV (postoperative nausea and vomiting)      Past Surgical History:   Procedure Laterality Date    APPENDECTOMY      BREAST LUMPECTOMY      BREAST SURGERY Left     biopsy    CRYOABLATION      NH BIOPSY/EXCISION, LYMPH NODE(S) Left 5/30/2017    Procedure: LYMPHOSCINTIGRAPHY; INTRAOPERATIVE LYMPHATIC MAPPING; SENTINEL LYMPH NODE BIOPSY (INJECT BY DR RIOS AT 6218); Surgeon: Aristeo De La Cruz MD;  [facility];  Service: Surgical Oncology    NH PERQ DEVICE PLACEMT BREAST LOC 1ST LES W GUIDNCE Left 5/30/2017    Procedure: BREAST TWO SITE LUMPECTOMY; BREAST TWO SITE NEEDLE LOCALIZATION (TWO SITE NEEDLE LOC AT 0700);   Surgeon: Aristeo De La Cruz MD;  [facility];  Service: Surgical Oncology    TONSILLECTOMY         Social History   History   Alcohol Use No     History   Drug Use No     History   Smoking Status    Former Smoker    Quit date: 1974   Smokeless Tobacco    Never Used     Meds/Allergies     Current Outpatient Prescriptions:     ALPRAZolam (XANAX) 0 25 mg tablet, Take by mouth daily at bedtime as needed for anxiety, Disp: , Rfl:     anastrozole (ARIMIDEX) 1 mg tablet, Take 1 tablet by mouth daily, Disp: , Rfl:     CALCIUM CITRATE PO, Take 2 tablets by mouth 2 (two) times daily after meals  , Disp: , Rfl:     Cholecalciferol (VITAMIN D3) 5000 units TABS, Take by mouth, Disp: , Rfl:     hyoscyamine (ANASPAZ,LEVSIN) 0 125 MG tablet, Take 0 125 mg by mouth every 4 (four) hours as needed for cramping, Disp: , Rfl:     Multiple Vitamin (MULTIVITAMIN) tablet, Take 2 tablets by mouth daily in the early morning  , Disp: , Rfl:     psyllium (METAMUCIL) 58 6 % packet, Take 1 packet by mouth daily, Disp: , Rfl:     ranitidine (ZANTAC) 150 MG capsule, Take 150 mg by mouth 2 (two) times a day, Disp: , Rfl:     triamterene-hydrochlorothiazide (MAXZIDE-25) 37 5-25 mg per tablet, Triamterene-HCTZ 37 5-25 MG Oral Tablet  Quantity: 100 00; Refills: 0   Started 5-Jan-2012 Active, Disp: , Rfl:   Allergies   Allergen Reactions    Dust Mite Extract Other (See Comments)     sneezing    Molds & Smuts Allergic Rhinitis    Shellfish Allergy GI Intolerance    Shellfish-Derived Products GI Intolerance and Vomiting     Review of Systems  Constitutional: Negative  HENT: Negative  Eyes: Negative  Respiratory: Positive for cough  Shortness of breath: dry cough from acid reflux  Cardiovascular: Negative  Gastrointestinal: Positive for abdominal pain (at times due to IBS and acid reflux  )  Constipation: IBS  Diarrhea: IBS  Endocrine: Negative  Genitourinary:        Nocturia at time  Musculoskeletal: Negative  Skin: Negative      Allergic/Immunologic: Positive for environmental allergies and food allergies  Neurological: Positive for dizziness  Hematological: Negative  Psychiatric/Behavioral: Negative    OBJECTIVE:   /60 (BP Location: Left arm, Patient Position: Sitting, Cuff Size: Standard)   Pulse 76   Temp 98 1 °F (36 7 °C) (Temporal)   Resp 16   Ht 5' 4 5" (1 638 m)   Wt 62 kg (136 lb 9 6 oz)   SpO2 98%   BMI 23 09 kg/m²   Pain Assessment:  0  ECOG/Zubrod/WHO: 0 - Asymptomatic    Physical Exam   Constitutional: She is oriented to person, place, and time  She appears well-developed and well-nourished  No distress  HENT:   Head: Normocephalic and atraumatic  Mouth/Throat: No oropharyngeal exudate  Eyes: Conjunctivae and EOM are normal  Pupils are equal, round, and reactive to light  No scleral icterus  Neck: Normal range of motion  Neck supple  No tracheal deviation present  No thyromegaly present  Cardiovascular: Normal rate, regular rhythm and normal heart sounds  Pulmonary/Chest: Effort normal and breath sounds normal  No respiratory distress  She has no wheezes  She has no rales  She exhibits no tenderness  Right breast exhibits no inverted nipple, no mass, no nipple discharge, no skin change and no tenderness  Left breast exhibits no inverted nipple, no mass, no nipple discharge, no skin change (Left breast incision well healed with no induration and no masses ) and no tenderness  Abdominal: Soft  Bowel sounds are normal  She exhibits no distension and no mass  There is no tenderness  Musculoskeletal: Normal range of motion  She exhibits no edema or tenderness  Lymphadenopathy:     She has no cervical adenopathy  Neurological: She is alert and oriented to person, place, and time  No cranial nerve deficit  Coordination normal    Skin: Skin is warm and dry  No rash noted  She is not diaphoretic  No erythema  No pallor  Psychiatric: She has a normal mood and affect   Her behavior is normal  Judgment and thought content normal  Nursing note and vitals reviewed  RESULTS    Lab Results: No results found for this or any previous visit (from the past 672 hour(s))  Imaging Studies:Dxa Bone Density Spine Hip And Pelvis    Result Date: 4/30/2018  Narrative: CENTRAL  DXA SCAN CLINICAL HISTORY:   65year old post-menopausal  female risk factors include [breast carcinoma, status post radiation therapy and chemotherapy (anastrozole)  Prior treatment for osteoporosis (discontinued March, 2017)  TECHNIQUE: Bone densitometry was performed using a Horizon A bone densitometer  Regions of interest appear properly placed  There are no obvious fractures or other confounding variables which could limit the study  COMPARISON:  Several prior studies, most recent April 14, 2016 RESULTS: LUMBAR SPINE:  L1-L4: BMD 0 929 gm/cm2 T-score -1 1 Z-score 0 5 LEFT TOTAL HIP: BMD 0 845 gm/cm2 T-score -0 8 Z-score 0 2 LEFT FEMORAL NECK: BMD 0 577 gm/cm2 T-score -2 5 Z-score -1 1     Impression: 1  Based on the [facility] classification, the T-score of -2 5 in the left hip is consistent with osteoporosis  2  When compared to the prior examination, there has been a  3 % INCREASE in the total bone mineral density of the lumbar spine  There has been no significant change in the total bone mineral density of the left hip  3   According to the [facility], prescription therapy is recommended with a T-score of -2 5 or less in the spine or hip after appropriate evaluation to exclude secondary causes  4   A daily intake of at least 1200 mg Calcium and 800 to 1000 IU of Vitamin D, as well as weight bearing and muscle strengthening exercise, fall prevention and avoidance of tobacco and excessive alcohol intake as  basic preventive measures are suggested  5   Repeat DXA  in 18 - 24 months, on the same machine, as clinically indicated   The 10 year risk of hip fracture is 2%, with the 10 year risk of major osteoporotic fracture being 10%, as calculated by the [facility] fracture risk assessment tool (FRAX)  The current NOF guidelines recommend treating patients with FRAX 10 year risk score of  >3% for hip fracture and >20% for major osteoporotic fracture  WHO CLASSIFICATION: Normal (a T-score of -1 0 or higher) Low bone mineral density (a T-score of less than -1 0 but higher than -2 5) Osteoporosis (a T-score of -2 5 or less) Severe osteoporosis (a T-score of -2 5 or less with a fragility fracture)   Workstation performed: [id number]     Mammo Diagnostic Bilateral W 3d & Cad    Result Date: 4/27/2018  Narrative: Patient History: Patient is postmenopausal and has 2 history of cancer in the left breast at age 62  Family history of breast cancer at age 52 in paternal aunt, premenopausal breast cancer at age 39 in paternal grandmother, prostate cancer at age 62 in father  Malignant lumpectomy of the left breast, May 10, 2017  Malignant US guidance breast biopsy left (ea add), May 10, 2017  Malignant US guided breast biopsy left, May 10, 2017  Mammo Pathology Letter of the left breast, May 10, 2017  Benign excisional biopsy of the left breast, 1997  Took hormonal contraceptives for 1 year  Patient is a former smoker, and smoked for 2 years  Patient's BMI is 21 6  Reason for exam: history of breast cancer, conservation therapy  70-year-old female, status post left lumpectomy in 2017  Mammo Diagnostic Bilateral W DBT and CAD: April 27, 2018 - Check In #: [de-identified] 2D/3D Procedure 3D views: Bilateral MLO view(s) were taken  2D views: Bilateral CC view(s) were taken  MLO view(s) were taken of the left breast  Technologist: MELANIA Martinez (MELANIA)(M) Prior study comparison: May 5, 2017, mammo diagnostic left W CAD performed at [address] [place] [address]  April 20, 2017, mammo screening bilateral W CAD, performed at [facility]  April 14, 2016, mammo screening bilateral W CAD, performed at [facility]    March 31, 2015, digital bilateral screening mammogram, performed at [address] [facility]  March 25, 2014, digital bilateral screening mammogram, performed at [address] [place] [address]  March 20, 2013, digital bilateral screening mammogram, performed at [address] [place] [address]  February 21, 2012, digital bilateral screening mammogram, performed at [address] [place] [address]  February 9, 2011, digital bilateral screening mammogram, performed at [address] [place] [address]  February 8, 2010, digital bilateral screening mammogram, performed at [address] [place] [address]  There are scattered fibroglandular densities  There are postsurgical changes in the left breast from prior lumpectomy  On the current study, a few calcifications are seen in the upper medial right breast at approximately the 2 o'clock position, 3 cm deep to the nipple  These do not have a worrisome appearance but I would recommend that the patient return in 6 months for repeat diagnostic mammography to ensure continued stability  No dominant masses are seen  There is left breast skin thickening secondary to postsurgical and post radiation changes  There is no skin thickening on the right  The axillary regions are benign  IMPRESSION:1  There are a few calcifications in the upper medial right breast, for which six-month follow-up right diagnostic mammogram is recommended to ensure stability  2   No mammographic evidence of malignancy on the left  ACR BI-RADS® Assessments: BiRad:3 - Probably Benign - Right Recommendation: Follow-up diagnostic mammogram of the right breast in 6 months  The patient is scheduled in a reminder system for screening mammography  8-10% of cancers will be missed on mammography  Management of a palpable abnormality must be based on clinical grounds  Patients will be notified of their results via letter from our facility  Accredited by [employer] of Radiology and FDA   Transcription Location: [address] [place], [place] [address]way: [id number] Risk Value(s): [employer] Table: 50 0%, MRS : Based on personal and/or family history, consideration of hereditary risk assessment may be warranted  Assessment/Plan:  No orders of the defined types were placed in this encounter  Rachel Youssef is a 65y o  year old female with a left breast carcinoma with stage IA, pT1b, pN0, M0 grade 2, ER positive at 100%, NC positive at 1-2% and HER-2/yanni negative invasive mammary carcinoma  She also had multiple foci of ductal carcinoma in situ  She is status post lumpectomy with negative margins and had a negative sentinel lymph node procedure  Her MammaPrint came back low risk  She was seen by Dr La Nena Vyas and recommendations were made for adjuvant hormonal therapy with anastrozole daily  In order to complete her breast conservation management, we recommended radiation therapy to the entire left breast that was completed on 9/5/17  She returns today for follow-up examination  She continues to apply a moisturizer and massage the treated left breast on a daily basis  She is tolerating anastrozole well  She denies any hot flashes  She had DEXA scan April 30, 2018 that reveals some osteoporosis in the left hip  She is on calcium with vitamin-D supplementation  She remains active with walking and exercising regularly  She has follow-up in 2weeks with Dr La Nena Vyas  She had bilateral mammogram April 27, 2018 that was negative  She will see Dr Carl Bernal May 3, 2018  She will return here for follow-up in 6 months  Alfreda Schultz MD  1/3/5818,5:63 AM    Portions of the record may have been created with voice recognition software   Occasional wrong word or "sound a like" substitutions may have occurred due to the inherent limitations of voice recognition software   Read the chart carefully and recognize, using context, where substitutions have occurred

## 2018-05-01 NOTE — PROGRESS NOTES
Rosalie Rivera  1955   Ms Hollie Lima is a 65 y o  female       Chief Complaint   Patient presents with    Follow-up       Cancer Staging  No matching staging information was found for the patient  Malignant neoplasm of upper-outer quadrant of left breast in female, estrogen receptor positive ([facility] )    5/10/2017 Initial Diagnosis     Malignant neoplasm of upper-outer quadrant of left female breast ([facility] )         5/10/2017 Biopsy     Left breast biopsy  Invasive ductal carcinoma, Grade 2   % positive, AK 1-2% positive, HER2 negative         5/30/2017 Surgery     Left lumpectomy  2 foci of Grade 2 invasive ductal carcinoma with DCIS present         5/30/2017 -  Cancer Staged     Stage IA pT1b(m) pN0         7/2017 -  Hormone Therapy     Anastrozole - Dr Vinay Guzman         7/25/2017 - 9/5/2017 Radiation     Left breast   5000 cGy to the entire breast and a total dose of 6000 cGy to the lumpectomy cavity  Clinical Trial: no        Interval History: Last seen on 10/3/17    4/27/18 Bilateral diagnostic mammogeam   IMPRESSION:1  There are a few calcifications in the upper medial  right breast, for which six-month follow-up right diagnostic   mammogram is recommended to ensure stability  2   No mammographic evidence of malignancy on the left      ACR BI-RADS® Assessments: BiRad:3 - Probably Benign - Right     Recommendation:  Follow-up diagnostic mammogram of the right breast in 6 months  5/3/18 Surgical Oncology follow up    5/16/18 Dr Vinay Guzman    10/29/18 Mammogram scheduled      Screening  Tobacco  Current tobacco user: yes  If yes, brief counseling provided: NA    Hypertension  Hypertension screening performed: yes  Normotensive:  yes  If no, referred to PCP: n/a    Depression Screening  Screened for depression using PHQ-2: no    Screened for depression using PHQ-9:  yes  Screening positive or negative:  negative  If score >4, was any of the following actions taken?    Additional evaluation for depression, suicide risk assesment, referral to PCP or psychiatry, medication started:  no, n/a    Advanced Care Planning for Patients >65 years  Advanced Care Planning Discussed:  yes  Patient named surrogate decision maker or care plan in chart:  no    There are no preventive care reminders to display for this patient  Patient Active Problem List   Diagnosis    Malignant neoplasm of upper-outer quadrant of left breast in female, estrogen receptor [facility]    Allergic rhinitis    Irritable bowel syndrome    Meniere's disease    Meniere's disease of right ear    Osteopenia    Osteoporosis    Seasonal allergies     Past Medical History:   Diagnosis Date    Acid reflux     [facility]     History of external beam radiation therapy     Irritable bowel syndrome     Meniere's disease     Osteopenia of the elderly     PONV (postoperative nausea and vomiting)      Past Surgical History:   Procedure Laterality Date    APPENDECTOMY      BREAST LUMPECTOMY      BREAST SURGERY Left     biopsy    CRYOABLATION      AR BIOPSY/EXCISION, LYMPH NODE(S) Left 5/30/2017    Procedure: LYMPHOSCINTIGRAPHY; INTRAOPERATIVE LYMPHATIC MAPPING; SENTINEL LYMPH NODE BIOPSY (INJECT BY DR RIOS AT 2766); Surgeon: Tanisha Nova MD;  [facility];  Service: Surgical Oncology    AR PERQ DEVICE PLACEMT BREAST LOC 1ST LES W GUIDNCE Left 5/30/2017    Procedure: BREAST TWO SITE LUMPECTOMY; BREAST TWO SITE NEEDLE LOCALIZATION (TWO SITE NEEDLE LOC AT 0700);   Surgeon: Tanisha Nova MD;  [facility];  Service: Surgical Oncology    TONSILLECTOMY       Family History   Problem Relation Age of Onset    Lymphoma Mother     Tuberculosis Mother     Prostate cancer Father     Diabetes Father     Breast cancer Paternal Aunt     Breast cancer Paternal Grandmother      Social History     Social History    Marital status: Single     Spouse name: N/A    Number of children: N/A    Years of education: N/A     Occupational History    Not on file  Social History Main Topics    Smoking status: Former Smoker     Quit date: 1974    Smokeless tobacco: Never Used    Alcohol use No    Drug use: No    Sexual activity: Not on file     Other Topics Concern    Not on file     Social History Narrative    No narrative on file       Current Outpatient Prescriptions:     ALPRAZolam (XANAX) 0 25 mg tablet, Take by mouth daily at bedtime as needed for anxiety, Disp: , Rfl:     anastrozole (ARIMIDEX) 1 mg tablet, Take 1 tablet by mouth daily, Disp: , Rfl:     CALCIUM CITRATE PO, Take 2 tablets by mouth 2 (two) times daily after meals  , Disp: , Rfl:     Cholecalciferol (VITAMIN D3) 5000 units TABS, Take by mouth, Disp: , Rfl:     hyoscyamine (ANASPAZ,LEVSIN) 0 125 MG tablet, Take 0 125 mg by mouth every 4 (four) hours as needed for cramping, Disp: , Rfl:     Multiple Vitamin (MULTIVITAMIN) tablet, Take 2 tablets by mouth daily in the early morning  , Disp: , Rfl:     psyllium (METAMUCIL) 58 6 % packet, Take 1 packet by mouth daily, Disp: , Rfl:     ranitidine (ZANTAC) 150 MG capsule, Take 150 mg by mouth 2 (two) times a day, Disp: , Rfl:     triamterene-hydrochlorothiazide (MAXZIDE-25) 37 5-25 mg per tablet, Triamterene-HCTZ 37 5-25 MG Oral Tablet  Quantity: 100 00; Refills: 0   Started 5-Jan-2012 Active, Disp: , Rfl:   Allergies   Allergen Reactions    Dust Mite Extract Other (See Comments)     sneezing    Molds & Smuts Allergic Rhinitis    Shellfish Allergy GI Intolerance    Shellfish-Derived Products GI Intolerance and Vomiting       Review of Systems:  Review of Systems   Constitutional: Negative  HENT: Negative  Eyes: Negative  Respiratory: Positive for cough  Shortness of breath: dry cough from acid reflux  Cardiovascular: Negative  Gastrointestinal: Positive for abdominal pain (at times due to IBS and acid reflux  )  Constipation: IBS  Diarrhea: IBS  Endocrine: Negative  Genitourinary:        Nocturia at time  Musculoskeletal: Negative  Skin: Negative  Allergic/Immunologic: Positive for environmental allergies and food allergies  Neurological: Positive for dizziness  Hematological: Negative  Psychiatric/Behavioral: Negative  Vitals:    05/01/18 0758   BP: 118/60   BP Location: Left arm   Patient Position: Sitting   Cuff Size: Standard   Pulse: 76   Resp: 16   Temp: 98 1 °F (36 7 °C)   TempSrc: Temporal   SpO2: 98%   Weight: 62 kg (136 lb 9 6 oz)   Height: 5' 4 5" (1 638 m)         Imaging:Dxa Bone Density Spine Hip And Pelvis    Result Date: 4/30/2018  Narrative: CENTRAL  DXA SCAN CLINICAL HISTORY:   65year old post-menopausal  female risk factors include [breast carcinoma, status post radiation therapy and chemotherapy (anastrozole)  Prior treatment for osteoporosis (discontinued March, 2017)  TECHNIQUE: Bone densitometry was performed using a Horizon A bone densitometer  Regions of interest appear properly placed  There are no obvious fractures or other confounding variables which could limit the study  COMPARISON:  Several prior studies, most recent April 14, 2016 RESULTS: LUMBAR SPINE:  L1-L4: BMD 0 929 gm/cm2 T-score -1 1 Z-score 0 5 LEFT TOTAL HIP: BMD 0 845 gm/cm2 T-score -0 8 Z-score 0 2 LEFT FEMORAL NECK: BMD 0 577 gm/cm2 T-score -2 5 Z-score -1 1     Impression: 1  Based on the [facility] classification, the T-score of -2 5 in the left hip is consistent with osteoporosis  2  When compared to the prior examination, there has been a  3 % INCREASE in the total bone mineral density of the lumbar spine  There has been no significant change in the total bone mineral density of the left hip  3   According to the [address] [place] [address], prescription therapy is recommended with a T-score of -2 5 or less in the spine or hip after appropriate evaluation to exclude secondary causes   4   A daily intake of at least 1200 mg Calcium and 800 to 1000 IU of Vitamin D, as well as weight bearing and muscle strengthening exercise, fall prevention and avoidance of tobacco and excessive alcohol intake as  basic preventive measures are suggested  5   Repeat DXA  in 18 - 24 months, on the same machine, as clinically indicated  The 10 year risk of hip fracture is 2%, with the 10 year risk of major osteoporotic fracture being 10%, as calculated by the [facility] fracture risk assessment tool (FRAX)  The current NOF guidelines recommend treating patients with FRAX 10 year risk score of  >3% for hip fracture and >20% for major osteoporotic fracture  WHO CLASSIFICATION: Normal (a T-score of -1 0 or higher) Low bone mineral density (a T-score of less than -1 0 but higher than -2 5) Osteoporosis (a T-score of -2 5 or less) Severe osteoporosis (a T-score of -2 5 or less with a fragility fracture)   Workstation performed: [id number]     Mammo Diagnostic Bilateral W 3d & Cad    Result Date: 4/27/2018  Narrative: Patient History: Patient is postmenopausal and has 2 history of cancer in the left breast at age 62  Family history of breast cancer at age 52 in paternal aunt, premenopausal breast cancer at age 39 in paternal grandmother, prostate cancer at age 61 in father  Malignant lumpectomy of the left breast, May 10, 2017  Malignant US guidance breast biopsy left (ea add), May 10, 2017  Malignant US guided breast biopsy left, May 10, 2017  Mammo Pathology Letter of the left breast, May 10, 2017  Benign excisional biopsy of the left breast, 1997  Took hormonal contraceptives for 1 year  Patient is a former smoker, and smoked for 2 years  Patient's BMI is 21 6  Reason for exam: history of breast cancer, conservation therapy  52-year-old female, status post left lumpectomy in 2017  Mammo Diagnostic Bilateral W DBT and CAD: April 27, 2018 - Check In #: [de-identified] 2D/3D Procedure 3D views: Bilateral MLO view(s) were taken  2D views: Bilateral CC view(s) were taken    MLO view(s) were taken of the left breast  Technologist: MELANIA Pate (R)(M) Prior study comparison: May 5, 2017, mammo diagnostic left W CAD performed at [address] [place] [address]  April 20, 2017, mammo screening bilateral W CAD, performed at [address] [place] [address]  April 14, 2016, mammo screening bilateral W CAD, performed at [address] [place] [address]  March 31, 2015, digital bilateral screening mammogram, performed at [address] [place] [address]  March 25, 2014, digital bilateral screening mammogram, performed at [address] [place] [address]  March 20, 2013, digital bilateral screening mammogram, performed at [address] [place] [address]  February 21, 2012, digital bilateral screening mammogram, performed at [address] [place] [address]  February 9, 2011, digital bilateral screening mammogram, performed at [address] [place] [address]  February 8, 2010, digital bilateral screening mammogram, performed at [address] [place] [address]  There are scattered fibroglandular densities  There are postsurgical changes in the left breast from prior lumpectomy  On the current study, a few calcifications are seen in the upper medial right breast at approximately the 2 o'clock position, 3 cm deep to the nipple  These do not have a worrisome appearance but I would recommend that the patient return in 6 months for repeat diagnostic mammography to ensure continued stability  No dominant masses are seen  There is left breast skin thickening secondary to postsurgical and post radiation changes  There is no skin thickening on the right  The axillary regions are benign  IMPRESSION:1  There are a few calcifications in the upper medial right breast, for which six-month follow-up right diagnostic mammogram is recommended to ensure stability  2   No mammographic evidence of malignancy on the left   ACR BI-RADS® Assessments: BiRad:3 - Probably Benign - Right Recommendation: Follow-up diagnostic mammogram of the right breast in 6 months  The patient is scheduled in a reminder system for screening mammography  8-10% of cancers will be missed on mammography  Management of a palpable abnormality must be based on clinical grounds  Patients will be notified of their results via letter from our facility  Accredited by [address] [place] [address] of Radiology and [facility]  Transcription Location: [address] [place], [place] [address]way: [id number] Risk Value(s): Myriad Table: 50 0%, MRS : Based on personal and/or family history, consideration of hereditary risk assessment may be warranted

## 2018-05-03 ENCOUNTER — OFFICE VISIT (OUTPATIENT)
Dept: SURGICAL ONCOLOGY | Facility: CLINIC | Age: 63
End: 2018-05-03
Payer: COMMERCIAL

## 2018-05-03 VITALS
DIASTOLIC BLOOD PRESSURE: 88 MMHG | WEIGHT: 134 LBS | RESPIRATION RATE: 12 BRPM | TEMPERATURE: 98.2 F | SYSTOLIC BLOOD PRESSURE: 138 MMHG | HEIGHT: 65 IN | BODY MASS INDEX: 22.33 KG/M2 | HEART RATE: 80 BPM

## 2018-05-03 DIAGNOSIS — R92.8 ABNORMAL MAMMOGRAM OF RIGHT BREAST: ICD-10-CM

## 2018-05-03 DIAGNOSIS — Z17.0 MALIGNANT NEOPLASM OF UPPER-OUTER QUADRANT OF LEFT BREAST IN FEMALE, ESTROGEN RECEPTOR POSITIVE (HCC): Primary | ICD-10-CM

## 2018-05-03 DIAGNOSIS — C50.412 MALIGNANT NEOPLASM OF UPPER-OUTER QUADRANT OF LEFT BREAST IN FEMALE, ESTROGEN RECEPTOR POSITIVE (HCC): Primary | ICD-10-CM

## 2018-05-03 PROCEDURE — 99213 OFFICE O/P EST LOW 20 MIN: CPT | Performed by: NURSE PRACTITIONER

## 2018-05-03 RX ORDER — FEXOFENADINE HYDROCHLORIDE 60 MG/1
1 TABLET, FILM COATED ORAL AS NEEDED
COMMUNITY

## 2018-05-03 RX ORDER — HYOSCYAMINE SULFATE 0.12 MG/1
1 TABLET SUBLINGUAL 3 TIMES DAILY PRN
COMMUNITY
End: 2018-11-14 | Stop reason: HOSPADM

## 2018-05-03 RX ORDER — ELECTROLYTES/DEXTROSE
1 SOLUTION, ORAL ORAL
COMMUNITY
End: 2018-11-14 | Stop reason: SDUPTHER

## 2018-05-03 RX ORDER — LANOLIN ALCOHOL/MO/W.PET/CERES
1000 CREAM (GRAM) TOPICAL
COMMUNITY
End: 2018-11-14 | Stop reason: SDUPTHER

## 2018-05-03 NOTE — PROGRESS NOTES
Surgical Oncology Follow Up       [address] [facility],[address] [facility]  CANCER CARE [facility] SURGICAL ONCOLOGY [place] [address]  1955  [phone]  [address]  CANCER CARE [facility] SURGICAL ONCOLOGY [facility] [address]    Chief Complaint   Patient presents with    Breast Cancer     pt is here for 6 month follow up       Assessment/Plan:  1  Malignant neoplasm of upper-outer quadrant of left breast in female, estrogen receptor positive ([address] )  - 6 mo follow up visit    2  Abnormal mammogram of right breast    - Mammo diagnostic right w 3d & cad; Future      Discussion/Summary: Patient is a 15-year-old female who presents today for a 6 month follow-up for left breast cancer diagnosed in June 2017  Her pathology revealed invasive ductal carcinoma, Grade 2, %, WA 1-2%, HER-2 negative  She then underwent a left lumpectomy and sentinel node biopsy  Her tumor was low risk on mammaprint  She completed whole breast radiation therapy on September 5, 2017  She is currently taking anastrozole  She states that about a month ago, she started to have acid reflux issues, as well as a flare up of her IBS  She states she was started on medication for GERD  She states she also has an associated cough with the reflux  She states this is intermittent  I've instructed her to monitor her cough and to call us if it persists after reflux is controlled, or if the cough worsens  She denies SOB  She denies headaches, back pain, bone pain, abdominal pain  She had a bilateral diagnostic mammogram performed on April 27, 2018 which revealed a few calcifications in the right upper medial breast- these do not have a worrisome appearance  This was BI-RADS 3  No worrisome findings on physical exam  We will order a right 3D diagnostic mammogram for October 2018 and we will see the patient back in 6 months, or sooner if the need arises   She was instructed to call with any new concerns or symptoms  All of her questions were answered  History of Present Illness:        Malignant neoplasm of upper-outer quadrant of left breast in female, estrogen receptor positive ([facility] [address] )    5/10/2017 Initial Diagnosis     Malignant neoplasm of upper-outer quadrant of left female breast ([facility] [address] )         5/10/2017 Biopsy     Left breast biopsy  Invasive ductal carcinoma, Grade 2   % positive, GA 1-2% positive, HER2 negative         5/30/2017 Surgery     Left lumpectomy  2 foci of Grade 2 invasive ductal carcinoma with DCIS present         5/30/2017 -  Cancer Staged     Stage IA pT1b(m) pN0         7/2017 -  Hormone Therapy     Anastrozole - Dr Veronica Yuen         7/25/2017 - 9/5/2017 Radiation     Left breast   5000 cGy to the entire breast and a total dose of 6000 cGy to the lumpectomy cavity              -Interval History:  Patient presents today for a six-month follow-up visit for left breast cancer diagnosed in June 2017  She had a bilateral diagnostic mammogram performed on April 27, 2018 which revealed calcifications in the right upper medial breast   This was BI-RADS 3  She complains today of acid reflux and IBS symptoms  She also reports a dry, intermittent cough for which she is attributing to the reflux symptoms  She also states she was recently diagnosed with osteoporosis  She has a f/u appt next week with Dr Veronica Yuen to discuss results/treatment  Review of Systems:  Review of Systems   Constitutional: Negative for activity change, appetite change, chills, fatigue, fever and unexpected weight change  HENT: Negative for trouble swallowing  Eyes: Negative for pain, redness and visual disturbance  Respiratory: Positive for cough  Negative for shortness of breath and wheezing  Cardiovascular: Negative for chest pain, palpitations and leg swelling  Gastrointestinal: Positive for constipation and diarrhea  Negative for abdominal pain, nausea and vomiting  Endocrine: Negative for cold intolerance and heat intolerance  Musculoskeletal: Negative for arthralgias, back pain, gait problem and myalgias  Skin: Negative for color change and rash  Neurological: Negative for dizziness, syncope, light-headedness, numbness and headaches  Hematological: Negative for adenopathy  Psychiatric/Behavioral: Negative for agitation and confusion  All other systems reviewed and are negative  Patient Active Problem List   Diagnosis    Malignant neoplasm of upper-outer quadrant of left breast in female, estrogen receptor positive ([facility] [address] )    Allergic rhinitis    Irritable bowel syndrome    Meniere's disease    Meniere's disease of right ear    Osteopenia    Osteoporosis    Seasonal allergies     Past Medical History:   Diagnosis Date    Acid reflux     Breast cancer ([facility] [address] )     History of external beam radiation therapy     Irritable bowel syndrome     Meniere's disease     Osteopenia of the elderly     PONV (postoperative nausea and vomiting)      Past Surgical History:   Procedure Laterality Date    APPENDECTOMY      BREAST LUMPECTOMY      BREAST SURGERY Left     biopsy    CRYOABLATION      [place] BIOPSY/EXCISION, LYMPH NODE(S) Left 5/30/2017    Procedure: LYMPHOSCINTIGRAPHY; INTRAOPERATIVE LYMPHATIC MAPPING; SENTINEL LYMPH NODE BIOPSY (INJECT BY DR RIOS AT 5604); Surgeon: Rosendo Vieyra MD;  [facility];  Service: Surgical Oncology    KY PERQ DEVICE PLACEMT BREAST LOC 1ST LES W GUIDNCE Left 5/30/2017    Procedure: BREAST TWO SITE LUMPECTOMY; BREAST TWO SITE NEEDLE LOCALIZATION (TWO SITE NEEDLE LOC AT 0700);   Surgeon: Rosendo Vieyra MD;  [facility];  Service: Surgical Oncology    TONSILLECTOMY       Family History   Problem Relation Age of Onset    Lymphoma Mother     Tuberculosis Mother     Prostate cancer Father     Diabetes Father     Breast cancer Paternal Aunt     Breast cancer Paternal Grandmother      Social History     Social History    Marital status: Single     Spouse name: N/A    Number of children: N/A    Years of education: N/A     Occupational History    Not on file  Social History Main Topics    Smoking status: Former Smoker     Quit date: 1974    Smokeless tobacco: Never Used    Alcohol use No    Drug use: No    Sexual activity: Not on file     Other Topics Concern    Not on file     Social History Narrative    No narrative on file       Current Outpatient Prescriptions:     ALPRAZolam (XANAX) 0 25 mg tablet, Take by mouth daily at bedtime as needed for anxiety, Disp: , Rfl:     anastrozole (ARIMIDEX) 1 mg tablet, Take 1 tablet by mouth daily, Disp: , Rfl:     CALCIUM CITRATE PO, Take 2,000 tablets by mouth 2 (two) times daily after meals  , Disp: , Rfl:     Cholecalciferol (VITAMIN D3) 5000 units TABS, Take by mouth, Disp: , Rfl:     hyoscyamine (ANASPAZ,LEVSIN) 0 125 MG tablet, Take 0 125 mg by mouth every 4 (four) hours as needed for cramping, Disp: , Rfl:     Multiple Vitamin (MULTIVITAMIN) tablet, Take 2 tablets by mouth daily in the early morning  , Disp: , Rfl:     psyllium (METAMUCIL) 58 6 % packet, Take 1 packet by mouth daily, Disp: , Rfl:     ranitidine (ZANTAC) 150 MG capsule, Take 150 mg by mouth 2 (two) times a day, Disp: , Rfl:     triamterene-hydrochlorothiazide (MAXZIDE-25) 37 5-25 mg per tablet, Triamterene-HCTZ 37 5-25 MG Oral Tablet  Quantity: 100 00; Refills: 0   Started 5-Jan-2012 Active, Disp: , Rfl:   Allergies   Allergen Reactions    Dust Mite Extract Other (See Comments)     sneezing    Molds & Smuts Allergic Rhinitis    Shellfish Allergy GI Intolerance    Shellfish-Derived Products GI Intolerance and Vomiting     Vitals:    05/03/18 1511   BP: 138/88   Pulse: 80   Resp: 12   Temp: 98 2 °F (36 8 °C)       Physical Exam   Constitutional: She is oriented to person, place, and time  Vital signs are normal  She appears well-developed and well-nourished  No distress     HENT: Head: Normocephalic and atraumatic  Neck: Normal range of motion  Cardiovascular: Normal rate, regular rhythm and normal heart sounds  Pulmonary/Chest: Effort normal and breath sounds normal    Bilateral breasts were examined in the sitting and supine position  Left breast and left axilla surgical scar  Mild skin thickening s/p RT  There are no masses, skin nodules, nipple changes or nipple discharge  There is no bilateral supraclavicular or axillary lymphadenopathy noted  Abdominal: Soft  Normal appearance  She exhibits no mass  There is no hepatosplenomegaly  There is no tenderness  Musculoskeletal: Normal range of motion  Lymphadenopathy:     She has no axillary adenopathy  Right: No supraclavicular adenopathy present  Left: No supraclavicular adenopathy present  Neurological: She is alert and oriented to person, place, and time  Skin: Skin is warm, dry and intact  No rash noted  She is not diaphoretic  Psychiatric: She has a normal mood and affect  Her speech is normal    Vitals reviewed  Results:    Imaging  Mammo Diagnostic Bilateral W 3d & Cad    Result Date: 4/27/2018  Narrative: Patient History: Patient is postmenopausal and has 2 history of cancer in the left breast at age 62  Family history of breast cancer at age 52 in paternal aunt, premenopausal breast cancer at age 39 in paternal grandmother, prostate cancer at age 61 in father  Malignant lumpectomy of the left breast, May 10, 2017  Malignant US guidance breast biopsy left (ea add), May 10, 2017  Malignant US guided breast biopsy left, May 10, 2017  Mammo Pathology Letter of the left breast, May 10, 2017  Benign excisional biopsy of the left breast, 1997  Took hormonal contraceptives for 1 year  Patient is a former smoker, and smoked for 2 years  Patient's BMI is 21 6  Reason for exam: history of breast cancer, conservation therapy  60-year-old female, status post left lumpectomy in 2017   Mammo Diagnostic Bilateral W DBT and CAD: April 27, 2018 - Check In #: [de-identified] 2D/3D Procedure 3D views: Bilateral MLO view(s) were taken  2D views: Bilateral CC view(s) were taken  MLO view(s) were taken of the left breast  Technologist: MELANIA Calloway (MELANIA)(M) Prior study comparison: May 5, 2017, mammo diagnostic left W CAD performed at [address] [place] [address]  April 20, 2017, mammo screening bilateral W CAD, performed at [address] [place] [address]  April 14, 2016, mammo screening bilateral W CAD, performed at [address] [place] [address]  March 31, 2015, digital bilateral screening mammogram, performed at [address] [place] [address]  March 25, 2014, digital bilateral screening mammogram, performed at [address] [place] [address]  March 20, 2013, digital bilateral screening mammogram, performed at [address] [place] [address]  February 21, 2012, digital bilateral screening mammogram, performed at [address] [place] [address]  February 9, 2011, digital bilateral screening mammogram, performed at [address] [place] [address]  February 8, 2010, digital bilateral screening mammogram, performed at [address] [place] [address]  There are scattered fibroglandular densities  There are postsurgical changes in the left breast from prior lumpectomy  On the current study, a few calcifications are seen in the upper medial right breast at approximately the 2 o'clock position, 3 cm deep to the nipple  These do not have a worrisome appearance but I would recommend that the patient return in 6 months for repeat diagnostic mammography to ensure continued stability  No dominant masses are seen  There is left breast skin thickening secondary to postsurgical and post radiation changes  There is no skin thickening on the right  The axillary regions are benign  IMPRESSION:1    There are a few calcifications in the upper medial right breast, for which six-month follow-up right diagnostic mammogram is recommended to ensure stability  2   No mammographic evidence of malignancy on the left  ACR BI-RADS® Assessments: BiRad:3 - Probably Benign - Right Recommendation: Follow-up diagnostic mammogram of the right breast in 6 months  The patient is scheduled in a reminder system for screening mammography  8-10% of cancers will be missed on mammography  Management of a palpable abnormality must be based on clinical grounds  Patients will be notified of their results via letter from our facility  Accredited by [employer] of [employer] and FDA  Transcription Location: [address] [place] [address]: [id number] Risk Value(s): Myriad Table: 50 0%, MRS : Based on personal and/or family history, consideration of hereditary risk assessment may be warranted  I reviewed the above imaging data  Advance Care Planning/Advance Directives:  Discussed disease status, cancer treatment plans and/or cancer treatment goals with the patient

## 2018-05-16 ENCOUNTER — OFFICE VISIT (OUTPATIENT)
Dept: HEMATOLOGY ONCOLOGY | Facility: CLINIC | Age: 63
End: 2018-05-16
Payer: COMMERCIAL

## 2018-05-16 VITALS
DIASTOLIC BLOOD PRESSURE: 80 MMHG | OXYGEN SATURATION: 97 % | WEIGHT: 137 LBS | HEART RATE: 75 BPM | TEMPERATURE: 97 F | HEIGHT: 65 IN | BODY MASS INDEX: 22.82 KG/M2 | RESPIRATION RATE: 14 BRPM | SYSTOLIC BLOOD PRESSURE: 124 MMHG

## 2018-05-16 DIAGNOSIS — Z17.0 MALIGNANT NEOPLASM OF UPPER-OUTER QUADRANT OF LEFT BREAST IN FEMALE, ESTROGEN RECEPTOR POSITIVE (HCC): Primary | ICD-10-CM

## 2018-05-16 DIAGNOSIS — C50.412 MALIGNANT NEOPLASM OF UPPER-OUTER QUADRANT OF LEFT BREAST IN FEMALE, ESTROGEN RECEPTOR POSITIVE (HCC): Primary | ICD-10-CM

## 2018-05-16 PROCEDURE — 99214 OFFICE O/P EST MOD 30 MIN: CPT | Performed by: INTERNAL MEDICINE

## 2018-05-16 RX ORDER — ANASTROZOLE 1 MG/1
1 TABLET ORAL DAILY
Qty: 90 TABLET | Refills: 1 | Status: SHIPPED | OUTPATIENT
Start: 2018-05-16 | End: 2018-11-28 | Stop reason: SINTOL

## 2018-05-16 NOTE — PROGRESS NOTES
Hematology / Oncology Outpatient Follow Up Note    Ivelisse Rios 65 y o  female DAVID:[id number] MBQ:[id number]         Date:  5/16/2018    Assessment / Plan:  A 65year old postmenopausal woman with left breast cancer, stage IA, grade 2, % positive, ID 1-2% positive, HER-2 negative disease  She underwent lumpectomy with sentinel lymph node biopsy resulting in SHARYN  Her tumor was found to be low risk, based on the MammaPrint  Since July 2017, she has been on adjuvant hormonal therapy with anastrozole with no toxicity  Clinically, she has no evidence recurrent disease  I recommended her to continue with anastrozole 1 mg once a day  She has osteoporosis  She was on Fosamax for 5 years until March 2018  Her bone density is expected to decreased with anastrozole  Therefore, I recommended her to start denosumab 60 mg every 6 months  She is in agreement with my recommendation  I will see her again in 6 months when she is due for 2nd dose of denosumab  Subjective:     HPI:  A 62year old postmenopausal woman who was found to have abnormality in her left breast  Therefore, she underwent left breast biopsy in May 10, 2017, 9:00 position, 3 cm from the nipple on her left breast which showed invasive ductal carcinoma, grade 2  Subsequently, she underwent lumpectomy with sentinel lymph node biopsy by Dr Sarabjit Hubbard  She had 0 8 x 0 5 x 0 4 cm as well as 0 8 x 0 7 x 0 4 cm of invasive ductal carcinoma, grade 2  There was some lymphovascular invasion  One sentinel lymph node was negative for metastatic disease  This was % positive, ID 1-2% positive, HER-2 negative disease  Dr Sarabjit Hubbard sent her tumor tissue for MammaPrint, which came back low risk  She presented today with her daughter to discuss adjuvant treatment options  She feels well  She has no complaint of pain  She denied any respiratory symptoms  Her weight has been stable   She has osteopenia for which she was on oral bisphosphonate until a few years ago  Her most recent DEXA scan in April 2016 showed T score -2 4  She is up-to-date for colonoscopy  She has paternal grandmother as well as paternal aunt had a breast cancer  There is no first degree relative who has breast cancer  She is a lifetime never smoker  Her performance status is normal          Interval History:  A 65year-old postmenopausal woman with stage IA left breast cancer, grade 2, % positive, MN 1-2% positive, HER2 negative disease  She underwent lumpectomy with sentinel lymph node biopsy, resulting in SHARYN  Her tumor was found to be low risk, based on the MammaPrint  Therefore, adjuvant chemotherapy was not indicated  Since July 2017, she has been on adjuvant hormonal therapy with anastrozole  She came in today for routine follow-up  She recently had a flare of irritable bowel syndrome  She also was recently diagnosed with acid reflux  She is on H2 blocker  Otherwise, she feels well  She denied hot flashes or musculoskeletal symptoms  She has no respiratory symptoms  Her weight is stable  Her recent DEXA scan showed T-score-2 5, consistent with osteoporosis  She is no longer on Fosamax  Her performance status is normal       Objective:     Primary Diagnosis:    Left breast cancer, stage IA(pT1b, pN0,M0) grade 2, % positive, MN 1-2% positive, HER-2 negative disease  MammaPrint low risk  Diagnosed in May 2017  Cancer Staging:  Cancer Staging  No matching staging information was found for the patient  Previous Hematologic/ Oncologic Treatment:         Current Hematologic/ Oncologic Treatment:      Adjuvant hormonal therapy with anastrozole since July 2017  Disease Status:     SHARYN status post lumpectomy with sentinel lymph node biopsy  Test Results:    Pathology:    0 8 x 0 5 x 0 4 cm as well as 0 8 x 0 7 x 0 4 cm of invasive ductal carcinoma, grade 2  There is a lymphovascular invasion present  One sentinel lymph node was negative for metastatic disease   % positive, MI 1-2% positive, HER-2 negative disease  IA(pT1b, pN0,M0)  MammaPrint low risk  Radiology:    Mammography in April 2018 was probably benign  BI-RADS 3  DEXA scan in April 2018 showed T-score-2 5, consistent with osteoporosis  Laboratory:        Physical Exam:      General Appearance:    Alert, oriented        Eyes:    PERRL   Ears:    Normal external ear canals, both ears   Nose:   Nares normal, septum midline   Throat:   Mucosa moist  Pharynx without injection  Neck:   Supple       Lungs:     Clear to auscultation bilaterally   Chest Wall:    No tenderness or deformity    Heart:    Regular rate and rhythm       Abdomen:     Soft, non-tender, bowel sounds +, no organomegaly           Extremities:   Extremities no cyanosis or edema       Skin:   no rash or icterus  Lymph nodes:   Cervical, supraclavicular, and axillary nodes normal   Neurologic:   CNII-XII intact, normal strength, sensation and reflexes     Throughout          Breast exam:   lumpectomy scar upper inner quadrant of the left breast with no palpable abnormalities  Right breast exam is negative  ROS: Review of Systems   Gastrointestinal:        Left lower quadrant discomfort  Acid reflux  All other systems reviewed and are negative  Imaging: Dxa Bone Density Spine Hip And Pelvis    Result Date: 4/30/2018  Narrative: CENTRAL  DXA SCAN CLINICAL HISTORY:   65year old post-menopausal  female risk factors include [breast carcinoma, status post radiation therapy and chemotherapy (anastrozole)  Prior treatment for osteoporosis (discontinued March, 2017)  TECHNIQUE: Bone densitometry was performed using a Horizon A bone densitometer  Regions of interest appear properly placed  There are no obvious fractures or other confounding variables which could limit the study   COMPARISON:  Several prior studies, most recent April 14, 2016 RESULTS: LUMBAR SPINE:  L1-L4: BMD 0 929 gm/cm2 T-score -1 1 Z-score 0 5 LEFT TOTAL HIP: BMD 0 845 gm/cm2 T-score -0 8 Z-score 0 2 LEFT FEMORAL NECK: BMD 0 577 gm/cm2 T-score -2 5 Z-score -1 1     Impression: 1  Based on the [facility] classification, the T-score of -2 5 in the left hip is consistent with osteoporosis  2  When compared to the prior examination, there has been a  3 % INCREASE in the total bone mineral density of the lumbar spine  There has been no significant change in the total bone mineral density of the left hip  3   According to the [address] [place] [address], prescription therapy is recommended with a T-score of -2 5 or less in the spine or hip after appropriate evaluation to exclude secondary causes  4   A daily intake of at least 1200 mg Calcium and 800 to 1000 IU of Vitamin D, as well as weight bearing and muscle strengthening exercise, fall prevention and avoidance of tobacco and excessive alcohol intake as  basic preventive measures are suggested  5   Repeat DXA  in 18 - 24 months, on the same machine, as clinically indicated  The 10 year risk of hip fracture is 2%, with the 10 year risk of major osteoporotic fracture being 10%, as calculated by the [facility] fracture risk assessment tool (FRAX)  The current NOF guidelines recommend treating patients with FRAX 10 year risk score of  >3% for hip fracture and >20% for major osteoporotic fracture  WHO CLASSIFICATION: Normal (a T-score of -1 0 or higher) Low bone mineral density (a T-score of less than -1 0 but higher than -2 5) Osteoporosis (a T-score of -2 5 or less) Severe osteoporosis (a T-score of -2 5 or less with a fragility fracture)   Workstation performed: [id number]     Mammo Diagnostic Bilateral W 3d & Cad    Result Date: 4/27/2018  Narrative: Patient History: Patient is postmenopausal and has 2 history of cancer in the left breast at age 62  Family history of breast cancer at age 52 in paternal aunt, premenopausal breast cancer at age 39 in paternal grandmother, prostate cancer at age 62 in father   Malignant lumpectomy of the left breast, May 10, 2017  Malignant US guidance breast biopsy left (ea add), May 10, 2017  Malignant US guided breast biopsy left, May 10, 2017  Mammo Pathology Letter of the left breast, May 10, 2017  Benign excisional biopsy of the left breast, 1997  Took hormonal contraceptives for 1 year  Patient is a former smoker, and smoked for 2 years  Patient's BMI is 21 6  Reason for exam: history of breast cancer, conservation therapy  36-year-old female, status post left lumpectomy in 2017  Mammo Diagnostic Bilateral W DBT and CAD: April 27, 2018 - Check In #: [de-identified] 2D/3D Procedure 3D views: Bilateral MLO view(s) were taken  2D views: Bilateral CC view(s) were taken  MLO view(s) were taken of the left breast  Technologist: MELANIA Beach (R)(M) Prior study comparison: May 5, 2017, mammo diagnostic left W CAD performed at [address] [place] [address]  April 20, 2017, mammo screening bilateral W CAD, performed at [address] [place] [address]  April 14, 2016, mammo screening bilateral W CAD, performed at [address] [place] [address]  March 31, 2015, digital bilateral screening mammogram, performed at [address] [place] [address]  March 25, 2014, digital bilateral screening mammogram, performed at [address] [place] [address]  March 20, 2013, digital bilateral screening mammogram, performed at [address] [place] [address]  February 21, 2012, digital bilateral screening mammogram, performed at [address] [place] [address]  February 9, 2011, digital bilateral screening mammogram, performed at [address] [place] [address]  February 8, 2010, digital bilateral screening mammogram, performed at [address] [place] [address]  There are scattered fibroglandular densities  There are postsurgical changes in the left breast from prior lumpectomy  On the current study, a few calcifications are seen in the upper medial right breast at approximately the 2 o'clock position, 3 cm deep to the nipple    These do not have a worrisome appearance but I would recommend that the patient return in 6 months for repeat diagnostic mammography to ensure continued stability  No dominant masses are seen  There is left breast skin thickening secondary to postsurgical and post radiation changes  There is no skin thickening on the right  The axillary regions are benign  IMPRESSION:1  There are a few calcifications in the upper medial right breast, for which six-month follow-up right diagnostic mammogram is recommended to ensure stability  2   No mammographic evidence of malignancy on the left  ACR BI-RADS® Assessments: BiRad:3 - Probably Benign - Right Recommendation: Follow-up diagnostic mammogram of the right breast in 6 months  The patient is scheduled in a reminder system for screening mammography  8-10% of cancers will be missed on mammography  Management of a palpable abnormality must be based on clinical grounds  Patients will be notified of their results via letter from our facility  Accredited by [employer] of Radiology and FDA  Transcription Location: [address] [place], [place] [address]way: [id number] Risk Value(s): Myriad Table: 50 0%, MRS : Based on personal and/or family history, consideration of hereditary risk assessment may be warranted          Labs:   Lab Results   Component Value Date    WBC 7 77 05/18/2017    HGB 13 9 05/18/2017    HCT 40 8 05/18/2017    MCV 90 05/18/2017     05/18/2017     Lab Results   Component Value Date     05/18/2017    K 4 1 05/18/2017     05/18/2017    CO2 28 05/18/2017    ANIONGAP 9 05/18/2017    BUN 21 05/18/2017    CREATININE 0 72 05/18/2017    GLUCOSE 92 05/18/2017    CALCIUM 9 1 05/18/2017    AST 27 05/18/2017    ALT 44 05/18/2017    ALKPHOS 95 05/18/2017    PROT 7 0 05/18/2017    BILITOT 0 60 05/18/2017    EGFR >60 0 05/18/2017         Current Medications: Reviewed  Allergies: Reviewed  PMH/FH/SH:  Reviewed      Vital Sign:    Body surface area is 1 67 meters squared      Wt Readings from Last 3 Encounters:   05/16/18 62 1 kg (137 lb)   05/03/18 60 8 kg (134 lb)   05/01/18 62 kg (136 lb 9 6 oz)        Temp Readings from Last 3 Encounters:   05/16/18 (!) 97 °F (36 1 °C) (Tympanic)   05/03/18 98 2 °F (36 8 °C) (Oral)   05/01/18 98 1 °F (36 7 °C) (Temporal)        BP Readings from Last 3 Encounters:   05/16/18 124/80   05/03/18 138/88   05/01/18 118/60         Pulse Readings from Last 3 Encounters:   05/16/18 75   05/03/18 80   05/01/18 76     @LASTSAO2(3)@

## 2018-06-01 ENCOUNTER — HOSPITAL ENCOUNTER (OUTPATIENT)
Dept: INFUSION CENTER | Facility: CLINIC | Age: 63
Discharge: HOME | End: 2018-06-01
Payer: COMMERCIAL

## 2018-06-01 PROCEDURE — 96401 CHEMO ANTI-NEOPL SQ/IM: CPT

## 2018-06-01 RX ORDER — PANTOPRAZOLE SODIUM 40 MG/1
40 TABLET, DELAYED RELEASE ORAL DAILY
COMMUNITY
End: 2018-11-14 | Stop reason: HOSPADM

## 2018-06-01 RX ADMIN — DENOSUMAB 60 MG: 60 INJECTION SUBCUTANEOUS at 08:17

## 2018-06-01 NOTE — PROGRESS NOTES
Pt  Tolerated prolia SC injection in NELLY w/out adverse reaction  Confirmed pts  Next appt   Pt  Declined AVS

## 2018-06-27 ENCOUNTER — TELEPHONE (OUTPATIENT)
Dept: HEMATOLOGY ONCOLOGY | Facility: CLINIC | Age: 63
End: 2018-06-27

## 2018-06-27 NOTE — TELEPHONE ENCOUNTER
Spoke with patient  Advised that Anastrazole wouldn't cause her acid reflux, and that she should continue with her prescribed Acid Reflux medications from Dr Donell Benitez  Pt verbalized understanding and will continue to take Anastrazole as prescribed

## 2018-06-27 NOTE — TELEPHONE ENCOUNTER
Pt called  Seeing Dr Gutierrez Harris for acid reflux and irritable bowel since end of March  Irritable improving but not the acid reflux  Wondering if can take a break from Anastrozole to see if any improvement in acid reflux  Will be having a colonoscopy 7/19/18 and possibly endoscopy   Please advise

## 2018-08-13 ENCOUNTER — TRANSCRIBE ORDERS (OUTPATIENT)
Dept: ADMINISTRATIVE | Facility: HOSPITAL | Age: 63
End: 2018-08-13

## 2018-08-13 DIAGNOSIS — R10.11 ABDOMINAL PAIN, RIGHT UPPER QUADRANT: Primary | ICD-10-CM

## 2018-08-13 DIAGNOSIS — R10.13 ABDOMINAL PAIN, EPIGASTRIC: ICD-10-CM

## 2018-09-18 ENCOUNTER — OFFICE VISIT (OUTPATIENT)
Dept: SURGICAL ONCOLOGY | Facility: CLINIC | Age: 63
End: 2018-09-18
Payer: COMMERCIAL

## 2018-09-18 VITALS
WEIGHT: 131 LBS | RESPIRATION RATE: 16 BRPM | DIASTOLIC BLOOD PRESSURE: 80 MMHG | BODY MASS INDEX: 21.83 KG/M2 | TEMPERATURE: 98.2 F | HEART RATE: 68 BPM | SYSTOLIC BLOOD PRESSURE: 122 MMHG | HEIGHT: 65 IN

## 2018-09-18 DIAGNOSIS — R22.32 AXILLARY LUMP, LEFT: Primary | ICD-10-CM

## 2018-09-18 DIAGNOSIS — C50.412 MALIGNANT NEOPLASM OF UPPER-OUTER QUADRANT OF LEFT BREAST IN FEMALE, ESTROGEN RECEPTOR POSITIVE (HCC): ICD-10-CM

## 2018-09-18 DIAGNOSIS — Z17.0 MALIGNANT NEOPLASM OF UPPER-OUTER QUADRANT OF LEFT BREAST IN FEMALE, ESTROGEN RECEPTOR POSITIVE (HCC): ICD-10-CM

## 2018-09-18 PROCEDURE — 99213 OFFICE O/P EST LOW 20 MIN: CPT | Performed by: NURSE PRACTITIONER

## 2018-09-18 RX ORDER — NORTRIPTYLINE HYDROCHLORIDE 25 MG/1
CAPSULE ORAL
COMMUNITY
End: 2021-11-10

## 2018-09-18 NOTE — PROGRESS NOTES
Surgical Oncology Follow Up       [address] [place] [address],[facility]  [facility]GICAL [facility]Y [place] [address]  1955  [id number]  [address] [place] [address],[address] [place] [address]  CANCER [facility] SURGICAL ONCOLOGY [facility] [phone]    Chief Complaint   Patient presents with    2 left sided Axilla Lumps       Assessment/Plan:  1  Axillary lump, left  - warm compresses to areas  - call with changes  - call in 3 weeks for f/u    2  Malignant neoplasm of upper-outer quadrant of left breast in female, estrogen receptor positive ([facility] [address] )  - f/u as scheduled 11/14    Discussion/Summary: Patient is a 23-year-old female who presents today with complaints of two left breast lumps that she noticed last week  History of  left breast cancer diagnosed in June 2017  Her pathology revealed invasive ductal carcinoma, Grade 2, %, RI 1-2%, HER-2 negative  She then underwent a left lumpectomy and sentinel node biopsy  Her tumor was low risk on mammaprint  She completed whole breast radiation therapy on September 5, 2017  She states that last week, she noticed a tender axillary lump  She has applied warm compresses however the lump is not going away  No fevers/chills/redness  It is no longer tender, however, she then palpated a second slightly tender, smaller lump this morning  On exam, the larger lump in the deep axilla is consistent with a skin cyst/ inclusion cyst  There smaller lump anterior to the first lump may be a developing cyst  I've instructed the patient to monitor these areas and continue to apply warm compresses  Educated patient on s/s of ruptured sebaceous cyst  I've asked the patient to call in 3 weeks and if lumps are enlarging or not improved, may further assess with u/s  She is in agreement with this plan  She is scheduled to see me back in November and I have instructed her to keep that appt   She continues to follow with VIVEK Davenport for abdominal pain after eating most foods  She states her diet consists of mostly chicken, turkey and some fruit  She did have a CT of the abdomen and pelvis which did not reveal any worrisome findings  She also c/o involuntary gasps which she states only occur in the morning and after she had an EGD- encouraged f/u with Dr Da Bautista or her PCP for this  She is in agreement with this plan  All of her questions were answered  History of Present Illness:        Malignant neoplasm of upper-outer quadrant of left breast in female, estrogen receptor positive ([facility] [address] )    5/10/2017 Initial Diagnosis     Malignant neoplasm of upper-outer quadrant of left female breast ([facility] [address] )         5/10/2017 Biopsy     Left breast biopsy  Invasive ductal carcinoma, Grade 2   % positive, ID 1-2% positive, HER2 negative         5/30/2017 Surgery     Left lumpectomy  2 foci of Grade 2 invasive ductal carcinoma with DCIS present         5/30/2017 -  Cancer Staged     Stage IA pT1b(m) pN0         7/2017 -  Hormone Therapy     Anastrozole - Dr Delmis Glover         7/25/2017 - 9/5/2017 Radiation     Left breast   5000 cGy to the entire breast and a total dose of 6000 cGy to the lumpectomy cavity              -Interval History:   Patient presents today with complaints lumps in her left axilla She states that last week, she noticed a tender axillary lump  She has applied warm compresses however the lump is not going away  No fevers/chills/redness  It is no longer tender, however, she then palpated a second slightly tender, smaller lump this morning  States she has not shaved this area recently  Review of Systems:  Review of Systems   Constitutional: Negative for activity change, appetite change, chills, fatigue, fever and unexpected weight change  HENT: Negative for trouble swallowing  Eyes: Negative for pain, redness and visual disturbance  Respiratory: Negative for cough, shortness of breath and wheezing      Cardiovascular: Negative for chest pain, palpitations and leg swelling  Gastrointestinal: Positive for abdominal pain  Negative for constipation, diarrhea, nausea and vomiting  Endocrine: Negative for cold intolerance and heat intolerance  Musculoskeletal: Negative for arthralgias, back pain, gait problem and myalgias  Skin: Negative for color change and rash  Neurological: Negative for dizziness, syncope, light-headedness, numbness and headaches  Hematological: Negative for adenopathy  Psychiatric/Behavioral: Negative for agitation and confusion  All other systems reviewed and are negative  Patient Active Problem List   Diagnosis    Malignant [facility] of upper[facility] quadrant of left [facility], [facility]    Allergic rhinitis    Irritable bowel syndrome    Meniere's disease    Meniere's disease of right ear    Osteopenia    Osteoporosis    Seasonal allergies    Axillary lump, left     Past Medical History:   Diagnosis Date    Acid reflux     [facility]     History of external beam radiation therapy     Irritable bowel syndrome     Meniere's disease     Osteopenia of the elderly     PONV (postoperative nausea and vomiting)      Past Surgical History:   Procedure Laterality Date    APPENDECTOMY      BREAST LUMPECTOMY      BREAST SURGERY Left     biopsy    CRYOABLATION      OK BIOPSY/EXCISION, LYMPH NODE(S) Left 5/30/2017    Procedure: LYMPHOSCINTIGRAPHY; INTRAOPERATIVE LYMPHATIC MAPPING; SENTINEL LYMPH NODE BIOPSY (INJECT BY DR RIOS AT 9575); Surgeon: Shannan Alvarez MD;  [facility];  Service: Surgical Oncology    OK PERQ DEVICE PLACEMT BREAST LOC 1ST [facility] Left 5/30/2017    Procedure: BREAST TWO SITE LUMPECTOMY; BREAST TWO SITE NEEDLE LOCALIZATION (TWO SITE NEEDLE LOC AT 0700);   Surgeon: Shannan Alvarez MD;  [facility];  Service: Surgical Oncology    TONSILLECTOMY       Family History   Problem Relation Age of Onset    Lymphoma Mother     Tuberculosis Mother     Prostate cancer Father     Diabetes Father     Breast cancer Paternal Aunt     Breast cancer Paternal Grandmother      Social History     Social History    Marital status: Single     Spouse name: N/A    Number of children: N/A    Years of education: N/A     Occupational History    Not on file       Social History Main Topics    Smoking status: Former Smoker     Quit date: 1974    Smokeless tobacco: Never Used    Alcohol use No    Drug use: No    Sexual activity: Not on file     Other Topics Concern    Not on file     Social History Narrative    No narrative on file       Current Outpatient Prescriptions:     CALCIUM CITRATE PO, Take 2,000 tablets by mouth 2 (two) times daily after meals  , Disp: , Rfl:     fexofenadine (ALLEGRA) 60 MG tablet, Take 1 tablet by mouth as needed  , Disp: , Rfl:     nortriptyline (PAMELOR) 25 mg capsule, Take by mouth daily at bedtime, Disp: , Rfl:     Probiotic Product (PROBIOTIC-10) CAPS, Take by mouth, Disp: , Rfl:     ALPRAZolam (XANAX) 0 25 mg tablet, Take by mouth daily at bedtime as needed for anxiety, Disp: , Rfl:     anastrozole (ARIMIDEX) 1 mg tablet, Take 1 tablet (1 mg total) by mouth daily (Patient not taking: Reported on 9/18/2018 ), Disp: 90 tablet, Rfl: 1    Cholecalciferol (VITAMIN D3) 5000 units TABS, Take by mouth, Disp: , Rfl:     cyanocobalamin (VITAMIN B-12) 1,000 mcg tablet, Take 1,000 mcg by mouth, Disp: , Rfl:     hyoscyamine (ANASPAZ,LEVSIN) 0 125 MG tablet, Take 0 125 mg by mouth every 4 (four) hours as needed for cramping, Disp: , Rfl:     Hyoscyamine Sulfate SL 0 125 MG SUBL, Place 1 tablet under the tongue 3 (three) times a day as needed, Disp: , Rfl:     Multiple Vitamin (MULTIVITAMIN) tablet, Take 2 tablets by mouth daily in the early morning  , Disp: , Rfl:     Multiple Vitamins-Minerals (MULTIVITAMIN ADULT) TABS, Take 1 tablet by mouth, Disp: , Rfl:     pantoprazole (PROTONIX) 40 mg tablet, Take 40 mg by mouth daily, Disp: , Rfl:     psyllium (METAMUCIL) 58 6 % packet, Take 1 packet by mouth daily, Disp: , Rfl:     triamterene-hydrochlorothiazide (MAXZIDE-25) 37 5-25 mg per tablet, Triamterene-HCTZ 37 5-25 MG Oral Tablet  Quantity: 100 00; Refills: 0   Started 5-Jan-2012 Active, Disp: , Rfl:   Allergies   Allergen Reactions    Dust Mite Extract Other (See Comments)     sneezing    Molds & Smuts Allergic Rhinitis    Shellfish Allergy GI Intolerance    Shellfish-Derived Products GI Intolerance and Vomiting     Vitals:    09/18/18 1520   BP: 122/80   Pulse: 68   Resp: 16   Temp: 98 2 °F (36 8 °C)       Physical Exam   Constitutional: She is oriented to person, place, and time  Vital signs are normal  She appears well-developed and well-nourished  No distress  HENT:   Head: Normocephalic and atraumatic  Neck: Normal range of motion  Cardiovascular: Normal rate, regular rhythm and normal heart sounds  Pulmonary/Chest: Effort normal and breath sounds normal    Bilateral breasts were examined in the sitting and supine position  Mobile, round lump noted in mid axilla  Smaller, soft round lump noted anterior to this lump  No lymphadenopathy noted  No erythema  No drainage  Left breast and left axilla surgical scar  Mild skin thickening s/p RT  There are no masses, nipple changes or nipple discharge  Abdominal: Soft  Normal appearance  She exhibits no mass  There is no hepatosplenomegaly  There is no tenderness  Musculoskeletal: Normal range of motion  Lymphadenopathy:     She has no axillary adenopathy  Right: No supraclavicular adenopathy present  Left: No supraclavicular adenopathy present  Neurological: She is alert and oriented to person, place, and time  Skin: Skin is warm, dry and intact  No rash noted  She is not diaphoretic  Psychiatric: She has a normal mood and affect  Her speech is normal    Vitals reviewed          Results:    Imaging  2018 August  CT ABDOMEN PELVIS W IV AND ORAL CONTRAST8/24/2018  [facility]  Result Impression   Impression:  No acute abnormality within the abdomen or pelvis  Large degree of stool seen  throughout the colon suggesting constipation  Workstation:[id number]   Result Narrative   Clinical History: Abdominal pain, right upper quadrant; abdominal pain,  epigastric  Comparison: No previous abdominal CT  Comment: CT of the abdomen and pelvis was performed after the administration of  74 cc of Omnipaque 350 intravenous contrast  900 cc of barium solid oral  contrast was administered prior to the study as well  Abdomen:  Lower chest: Lung bases clear  Liver: Unremarkable  Spleen: Unremarkable  Pancreas: Unremarkable  Adrenal glands: Unremarkable  Gallbladder/bile ducts: Unremarkable  Kidneys: Small left peripelvic renal cysts  No hydronephrosis  Bowel: Small and large bowel normal in caliber  Mild sigmoid diverticulosis  without acute diverticulitis  Large degree of stool seen throughout the colon  suggesting constipation  Status post appendectomy  Lymph nodes: No retroperitoneal or mesenteric adenopathy  Peritoneum: No free fluid, free intraperitoneal air, or fluid collection within  the abdomen  Vessels: Abdominal aorta normal in caliber with mild atherosclerotic changes  Pelvis:  Urinary bladder: Grossly unremarkable  Lymph nodes: No pelvic adenopathy  Bones: Mild degenerative changes within the lumbosacral spine  Advance Care Planning/Advance Directives:  Discussed disease status, cancer treatment plans and/or cancer treatment goals with the patient

## 2018-10-01 ENCOUNTER — TELEPHONE (OUTPATIENT)
Dept: HEMATOLOGY ONCOLOGY | Facility: CLINIC | Age: 63
End: 2018-10-01

## 2018-10-01 NOTE — TELEPHONE ENCOUNTER
Kasey from the pharmacy and wanted to get further information about this patient Polia shot  Please contact her when you can to go over it

## 2018-10-09 ENCOUNTER — TELEPHONE (OUTPATIENT)
Dept: SURGICAL ONCOLOGY | Facility: CLINIC | Age: 63
End: 2018-10-09

## 2018-10-09 NOTE — TELEPHONE ENCOUNTER
Spoke with patient who states her cyst is almost completely resolved  She states the smaller cyst is completely resolved  She also states she developed three more above the original, but they have all resolved as well  I recommended continued observation and to call if they recur or the smaller remaining one enlarges again  I will see her for a f/u visit next month  All of her questions were answered

## 2018-10-29 ENCOUNTER — HOSPITAL ENCOUNTER (OUTPATIENT)
Dept: MAMMOGRAPHY | Facility: CLINIC | Age: 63
Discharge: HOME | End: 2018-10-29
Payer: COMMERCIAL

## 2018-10-29 DIAGNOSIS — R92.8 ABNORMAL MAMMOGRAM: ICD-10-CM

## 2018-10-29 PROCEDURE — G0279 TOMOSYNTHESIS, MAMMO: HCPCS

## 2018-10-29 PROCEDURE — 77065 DX MAMMO INCL CAD UNI: CPT

## 2018-11-06 ENCOUNTER — CLINICAL SUPPORT (OUTPATIENT)
Dept: RADIATION ONCOLOGY | Facility: HOSPITAL | Age: 63
End: 2018-11-06
Payer: COMMERCIAL

## 2018-11-06 ENCOUNTER — RADIATION ONCOLOGY FOLLOW-UP (OUTPATIENT)
Dept: RADIATION ONCOLOGY | Facility: HOSPITAL | Age: 63
End: 2018-11-06
Attending: RADIOLOGY
Payer: COMMERCIAL

## 2018-11-06 VITALS
DIASTOLIC BLOOD PRESSURE: 82 MMHG | RESPIRATION RATE: 14 BRPM | WEIGHT: 124.4 LBS | SYSTOLIC BLOOD PRESSURE: 138 MMHG | BODY MASS INDEX: 21.02 KG/M2 | TEMPERATURE: 98.9 F | HEART RATE: 93 BPM

## 2018-11-06 DIAGNOSIS — C50.412 MALIGNANT NEOPLASM OF UPPER-OUTER QUADRANT OF LEFT BREAST IN FEMALE, ESTROGEN RECEPTOR POSITIVE (HCC): Primary | ICD-10-CM

## 2018-11-06 DIAGNOSIS — Z17.0 MALIGNANT NEOPLASM OF UPPER-OUTER QUADRANT OF LEFT BREAST IN FEMALE, ESTROGEN RECEPTOR POSITIVE (HCC): Primary | ICD-10-CM

## 2018-11-06 PROCEDURE — 99215 OFFICE O/P EST HI 40 MIN: CPT | Performed by: RADIOLOGY

## 2018-11-06 NOTE — PROGRESS NOTES
Danielle Cisneros  1955   Ms Sharon Romero is a 59 y o  female       Chief Complaint   Patient presents with    Breast Cancer       Cancer Staging  No matching staging information was found for the patient  Oncology History    5/1/18 Last seen  9/18/18 Surg Onc ANIBAL Ribera 2 left sided Axilla Lumps       Assessment/Plan:  1  Axillary lump, left  - warm compresses to areas  - call with changes  - call in 3 weeks for f/u     10/9/18 Diane Doyle NP phone call- cyst is almost completely resolved  She states the smaller cyst is completely resolved  She also states she developed three more above the original, but they have all resolved as well  I recommended continued observation and to call if they recur or the smaller remaining one enlarges again  I will see her for a f/u visit next month    10/29/18 Right mammogram-BiRad:3 - Probably Benign  Malignant neoplasm of upper-outer quadrant of left breast in female, estrogen receptor positive ([facility] [address] )    5/10/2017 Initial Diagnosis     Malignant neoplasm of upper-outer quadrant of left female breast ([facility] [address] )         5/10/2017 Biopsy     Left breast biopsy  Invasive ductal carcinoma, Grade 2   % positive, MT 1-2% positive, HER2 negative         5/30/2017 Surgery     Left lumpectomy  2 foci of Grade 2 invasive ductal carcinoma with DCIS present         5/30/2017 -  Cancer Staged     Stage IA pT1b(m) pN0         7/2017 -  Hormone Therapy     Anastrozole - Dr Ketty Douglass stopped due to reflux in July, 2018  May restart           7/25/2017 - 9/5/2017 Radiation     Treatments:  Course: C1    Plan ID Energy Fractions Dose per Fraction (cGy) Total Dose Delivered (cGy) Elapsed Days   BH L Breast 6X 25 / 25 200 5,000 34   BH L Brst e 12E 5 / 5 200 1,000 7                                                                                  Total dose 6000 cGy  Treatment Dates:  7/25/2017 - 9/5/2017              Clinical Trial: no        Interval History 5/1/18 Last seen  9/18/18 Surg Onc ANIBAL Ribera     [facility] 2 left sided Axilla Lumps       Assessment/Plan:  1  Axillary lump, left  - warm compresses to areas  - call with changes  - call in 3 weeks for f/u     10/9/18 Barney Comment, NP phone call- cyst is almost completely resolved  She states the smaller cyst is completely resolved  She also states she developed three more above the original, but they have all resolved as well  I recommended continued observation and to call if they recur or the smaller remaining one enlarges again  I will see her for a f/u visit next month    10/29/18 Right mammogram-BiRad:3 - Probably Benign  Screening  Tobacco  Current tobacco user: no  If yes, brief counseling provided: NA    Hypertension  Hypertension screening performed: yes  Normotensive:  no  If no, referred to PCP: yes    Depression Screening  Screened for depression using PHQ-2: yes    Screened for depression using PHQ-9:  no  Screening positive or negative:  negative  If score >4, was any of the following actions taken?    Additional evaluation for depression, suicide risk assesment, referral to PCP or psychiatry, medication started:  n/a    Advanced Care Planning for Patients >65 years  Advanced Care Planning Discussed:  yes  Patient named surrogate decision maker or care plan in chart: yes    [unfilled]  Health Maintenance   Topic Date Due    CRC Screening: Colonoscopy  1955    Pneumococcal PPSV23 Highest Risk Adult (1 of 3 - PCV13) 09/26/1975    DTaP,Tdap,and Td Vaccines (1 - Tdap) 09/26/1977    INFLUENZA VACCINE  07/01/2018    Depression Screening PHQ  05/01/2019       Patient Active Problem List   Diagnosis    Malignant neoplasm of upper-outer quadrant of left breast in female, estrogen receptor positive ([facility] [address] )    Allergic rhinitis    Irritable bowel syndrome    Meniere's disease    Meniere's disease of right ear    Osteopenia    Osteoporosis    Seasonal allergies    Axillary lump, left     Past Medical History:   Diagnosis Date    Acid reflux     Breast cancer ([address] )     History of external beam radiation therapy     Irritable bowel syndrome     Meniere's disease     Osteopenia of the elderly     PONV (postoperative nausea and vomiting)      Past Surgical History:   Procedure Laterality Date    APPENDECTOMY      BREAST LUMPECTOMY      BREAST SURGERY Left     biopsy    CRYOABLATION      IN BIOPSY/EXCISION, LYMPH NODE(S) Left 5/30/2017    Procedure: LYMPHOSCINTIGRAPHY; INTRAOPERATIVE LYMPHATIC MAPPING; SENTINEL LYMPH NODE BIOPSY (INJECT BY DR RIOS AT 0246); Surgeon: Odalis Acosta MD;  [facility];  Service: Surgical Oncology    IN PERQ DEVICE PLACEMT BREAST LOC 1ST LES W [facility]UIDNCE Left 5/30/2017    Procedure: BREAST TWO SITE LUMPECTOMY; BREAST TWO SITE NEEDLE LOCALIZATION (TWO SITE NEEDLE LOC AT 0700); Surgeon: Odalis Acosta MD;  [facility];  Service: Surgical Oncology    TONSILLECTOMY       Family History   Problem Relation Age of Onset    Lymphoma Mother     Tuberculosis Mother     Prostate cancer Father     Diabetes Father     Breast cancer Paternal Aunt     Breast cancer Paternal Grandmother      Social History     Social History    Marital status: Single     Spouse name: N/A    Number of children: N/A    Years of education: N/A     Occupational History    Not on file       Social History Main Topics    Smoking status: Former Smoker     Quit date: 1974    Smokeless tobacco: Never Used    Alcohol use No    Drug use: No    Sexual activity: Not on file     Other Topics Concern    Not on file     Social History Narrative    No narrative on file       Current Outpatient Prescriptions:     ALPRAZolam (XANAX) 0 25 mg tablet, Take by mouth daily at bedtime as needed for anxiety, Disp: , Rfl:     anastrozole (ARIMIDEX) 1 mg tablet, Take 1 tablet (1 mg total) by mouth daily (Patient not taking: Reported on 9/18/2018 ), Disp: 90 tablet, Rfl: 1   CALCIUM CITRATE PO, Take 2,000 tablets by mouth 2 (two) times daily after meals  , Disp: , Rfl:     Cholecalciferol (VITAMIN D3) 5000 units TABS, Take by mouth, Disp: , Rfl:     cyanocobalamin (VITAMIN B-12) 1,000 mcg tablet, Take 1,000 mcg by mouth, Disp: , Rfl:     fexofenadine (ALLEGRA) 60 MG tablet, Take 1 tablet by mouth as needed  , Disp: , Rfl:     hyoscyamine (ANASPAZ,LEVSIN) 0 125 MG tablet, Take 0 125 mg by mouth every 4 (four) hours as needed for cramping, Disp: , Rfl:     Hyoscyamine Sulfate SL 0 125 MG SUBL, Place 1 tablet under the tongue 3 (three) times a day as needed, Disp: , Rfl:     Multiple Vitamin (MULTIVITAMIN) tablet, Take 2 tablets by mouth daily in the early morning  , Disp: , Rfl:     Multiple Vitamins-Minerals (MULTIVITAMIN ADULT) TABS, Take 1 tablet by mouth, Disp: , Rfl:     nortriptyline (PAMELOR) 25 mg capsule, Take by mouth daily at bedtime, Disp: , Rfl:     pantoprazole (PROTONIX) 40 mg tablet, Take 40 mg by mouth daily, Disp: , Rfl:     Probiotic Product (PROBIOTIC-10) CAPS, Take by mouth, Disp: , Rfl:     psyllium (METAMUCIL) 58 6 % packet, Take 1 packet by mouth daily, Disp: , Rfl:     triamterene-hydrochlorothiazide (MAXZIDE-25) 37 5-25 mg per tablet, Triamterene-HCTZ 37 5-25 MG Oral Tablet  Quantity: 100 00; Refills: 0   Started 5-Jan-2012 Active, Disp: , Rfl:   Allergies   Allergen Reactions    Dust Mite Extract Other (See Comments)     sneezing    Molds & Smuts Allergic Rhinitis    Shellfish Allergy GI Intolerance    Shellfish-Derived Products GI Intolerance and Vomiting       Review of Systems:  Review of Systems   Constitutional: Positive for unexpected weight change (Weight loss over past summer approx 17lbs due to GI issues)  HENT: Negative  Eyes: Negative  Respiratory:        Involuntary gasp for air for several months - a few times each morning  Per pt, Dr Jolanta Dejesus believes may be esophagus spasms  Cardiovascular: Negative      Gastrointestinal: Positive for constipation  Can't tolerate many foods  Eats mainly chicken  Diverticulosis and IBS  GERD  Dr Huseyni Bunch  Esophagus stretched 7/19/18  Endocrine: Negative  Genitourinary: Negative  Musculoskeletal: Negative  Skin: Negative  Allergic/Immunologic: Positive for environmental allergies  Neurological: Negative  Hematological: Negative  Psychiatric/Behavioral: Negative  Vitals:    11/06/18 1452   BP: 138/82   Pulse: 93   Resp: 14   Temp: 98 9 °F (37 2 °C)   Weight: 56 4 kg (124 lb 6 4 oz)       Pain Score: 0-No pain (02 99)    Imaging:Mammo Diagnostic Right W 3d & Cad    Result Date: 10/29/2018  Narrative: Patient History: Patient is postmenopausal and has 2 history of cancer in the left breast at age 62  Family history of breast cancer at age 52 in paternal aunt, premenopausal breast cancer at age 39 in paternal grandmother, prostate cancer at age 61 in father  Malignant lumpectomy of the left breast, May 10, 2017  Malignant US guidance breast biopsy left (ea add), May 10, 2017  Malignant US guided breast biopsy left, May 10, 2017  Mammo Pathology Letter of the left breast, May 10, 2017  Benign excisional biopsy of the left breast, 1997  Took hormonal contraceptives for 1 year  Patient is a former smoker, and smoked for 2 years  Patient's BMI is 21 6  Reason for exam: addl eval requested from prior study  Mammo Diagnostic Right W DBT and CAD: October 29, 2018 - Check In #: [de-identified] 2D/3D Procedure 3D views: MLO view(s) were taken of the right breast  2D views: CC view(s) were taken of the right breast  Technologist: MELANIA Todd (R)(M) Prior study comparison: April 27, 2018, mammo diagnostic bilateral W DBT and CAD performed at [address] [place] [address]  May 5, 2017, mammo diagnostic left W CAD performed at [address] [place] [address]  April 20, 2017, mammo screening bilateral W CAD, performed at [address] [place] [address]    April 14, 2016, mammo screening bilateral W CAD, performed at [address] [place] [address]  March 31, 2015, digital bilateral screening mammogram, performed at [address] [place] [address]  March 25, 2014, digital bilateral screening mammogram, performed at [address] [place] [address]  March 20, 2013, digital bilateral screening mammogram, performed at [address] [place] [address]  26-year-old woman presents for follow-up of calcifications in the right breast  Right diagnostic mammogram: Again noted is a loose grouping of calcifications in the upper central right breast, at the junction of the middle and distal thirds  These have not significantly changed since the prior exam   No suspicious mass, architectural distortion, or microcalcifications are noted  Scattered densities in the breast demonstrate long-term stability  IMPRESSION Stable calcifications in the right breast   Continued follow-up is recommended with bilateral diagnostic mammogram in 6 months when the patient is also due for her annual exam  ACR BI-RADS® Assessments: BiRad:3 - Probably Benign Recommendation: Follow-up diagnostic mammogram of both breasts in 6 months  The patient is scheduled in a reminder system for screening mammography  8-10% of cancers will be missed on mammography  Management of a palpable abnormality must be based on clinical grounds  Patients will be notified of their results via letter from our facility  Accredited by [employer] of Rad[employer] and FDA  Transcription Location: [address] [place], [place] [address]way: [id number] Risk Value(s): Myriad Table: 50 0%, MRS : Based on personal and/or family history, consideration of hereditary risk assessment may be warranted        Teaching

## 2018-11-06 NOTE — PROGRESS NOTES
Follow-up - Radiation Oncology   Hillary Aggarwal 1955 59 y o  female [id number]      History of Present Illness   Cancer Staging  See Below    Hillary Aggarwal is a 59y o  year old female with a history of a left breast carcinoma with stage IA, pT1b, pN0, M0 grade 2, ER positive at 100%, VA positive at 1-2% and HER-2/yanni negative invasive mammary carcinoma  She also had multiple foci of ductal carcinoma in situ  She is status post lumpectomy with negative margins and had a negative sentinel lymph node procedure  Her MammaPrint came back low risk  She was seen by Dr Macy Harman and recommendations were made for adjuvant hormonal therapy with anastrozole daily  In order to complete her breast conservation management, we recommended radiation therapy to the entire left breast that was completed on 9/5/17  She was last seen here for follow-up on May 1, 2018      Interval History:   9/18/18 Surg Jey Roe NP       2 left sided Axilla Lumps       Assessment/Plan:  1  Axillary lump, left  - warm compresses to areas  - call with changes  - call in 3 weeks for f/u      10/9/18 Jamesno Powell NP phone call- cyst is almost completely resolved  She states the smaller cyst is completely resolved  She also states she developed three more above the original, but they have all resolved as well  I recommended continued observation and to call if they recur or the smaller remaining one enlarges again  I will see her for a f/u visit next month     10/29/18 Right mammogram-BiRad:3 - Probably Benign       She reports the left axillary sebaceous cyst she had in September 2019 has now resolved with the use of warm compresses and has not recurred  She denies any breast masses bilaterally  She also denies any discharge from the areola  She received Prolia on June 1, 2018 and thinks she had significant side effects from this with her gastrointestinal system  She does not want to take anymore Prolia   She also has a history of GERD that she thinks was made worse from the Anastrozole which she stopped taking on July 12, 2018  She had colonoscopy in EGD with dilation July 19, 2018 with Dr Renita Jaramillo  Dilation has helped improve her dysphagia  She has severe abdominal pains with most foods and the only thing she can tolerate taking is chicken or turkey  She lost 17 pounds in 6 months through September 2018 and now reports her weight has been stable because she has been avoiding the foods that she cannot tolerate  She has a history of diverticulosis as well as irritable bowel disease and continues to follow with Dr Renita Jaramillo  She has had a variety of blood work and other testing done that is essentially been normal per her report  She is on Protonix well as a probiotic with Metamucil and has been on Pamelor since this summer which she thinks is helping  She has follow-up with Dr Renita Jaramillo again in December 2018  Clinical Trial: no      Screening  Tobacco  Current tobacco user: no  If yes, brief counseling provided: NA     Hypertension  Hypertension screening performed: yes  Normotensive:  no  If no, referred to PCP: yes     Depression Screening  Screened for depression using PHQ-2: yes     Screened for depression using PHQ-9:  no  Screening positive or negative:  negative  If score >4, was any of the following actions taken? Additional evaluation for depression, suicide risk assesment, referral to PCP or psychiatry, medication started:  n/a     Advanced Care Planning for Patients >65 years  Advanced Care Planning Discussed:  yes  Patient named surrogate decision maker or care plan in chart: yes     Historical Information      Malignant [facility] of upper-outer quadrant of left breast in female, [facility]    5/10/2017 Initial Diagnosis     Malignant neoplasm of upper-outer quadrant of left female breast ([facility] )         5/10/2017 Biopsy     Left breast biopsy  Invasive ductal carcinoma, Grade 2     % positive, CO 1-2% positive, HER2 negative         5/30/2017 Surgery     Left lumpectomy  2 foci of Grade 2 invasive ductal carcinoma with DCIS present         5/30/2017 -  Cancer Staged     Stage IA pT1b(m) pN0         7/2017 -  Hormone Therapy     Anastrozole - Dr Deacon Lanza stopped due to reflux in July, 2018  May restart  7/25/2017 - 9/5/2017 Radiation     Treatments:  Course: C1    Plan ID Energy Fractions Dose per Fraction (cGy) Total Dose Delivered (cGy) Elapsed Days   BH L Breast 6X 25 / 25 200 5,000 34   BH L Brst e 12E 5 / 5 200 1,000 7                                                                                  Total dose 6000 cGy  Treatment Dates:  7/25/2017 - 9/5/2017              Past Medical History:   Diagnosis Date    Acid reflux     [facility]     History of external beam radiation therapy     Irritable bowel syndrome     Meniere's disease     Osteopenia of the elderly     PONV (postoperative nausea and vomiting)      Past Surgical History:   Procedure Laterality Date    APPENDECTOMY      BREAST LUMPECTOMY      BREAST SURGERY Left     biopsy    CRYOABLATION      CO BIOPSY/EXCISION, LYMPH NODE(S) Left 5/30/2017    Procedure: LYMPHOSCINTIGRAPHY; INTRAOPERATIVE LYMPHATIC MAPPING; SENTINEL LYMPH NODE BIOPSY (INJECT BY DR RIOS AT 9982); Surgeon: Lucy Bonilla MD;  [facility];  Service: Surgical Oncology    CO PERQ DEVICE PLACEMT BREAST LOC 1ST [facility] W GUIDNCE Left 5/30/2017    Procedure: BREAST TWO SITE LUMPECTOMY; BREAST TWO SITE NEEDLE LOCALIZATION (TWO SITE NEEDLE LOC AT 0700);   Surgeon: Lucy Bonilla MD;  [facility];  Service: Surgical Oncology    TONSILLECTOMY         Social History   History   Alcohol Use No     History   Drug Use No     History   Smoking Status    Former Smoker    Quit date: 1974   Smokeless Tobacco    Never Used     Meds/Allergies     Current Outpatient Prescriptions:     CALCIUM CITRATE PO, Take 2,000 tablets by mouth 2 (two) times daily after meals  , Disp: , Rfl:     Cholecalciferol (VITAMIN D3) 5000 units TABS, Take by mouth, Disp: , Rfl:     fexofenadine (ALLEGRA) 60 MG tablet, Take 1 tablet by mouth as needed  , Disp: , Rfl:     Multiple Vitamin (MULTIVITAMIN) tablet, Take 2 tablets by mouth daily in the early morning  , Disp: , Rfl:     Multiple Vitamins-Minerals (MULTIVITAMIN ADULT) TABS, Take 1 tablet by mouth, Disp: , Rfl:     nortriptyline (PAMELOR) 25 mg capsule, Take by mouth daily at bedtime, Disp: , Rfl:     Probiotic Product (PROBIOTIC-10) CAPS, Take by mouth, Disp: , Rfl:     triamterene-hydrochlorothiazide (MAXZIDE-25) 37 5-25 mg per tablet, Triamterene-HCTZ 37 5-25 MG Oral Tablet  Quantity: 100 00; Refills: 0   Started 5-Jan-2012 Active, Disp: , Rfl:     ALPRAZolam (XANAX) 0 25 mg tablet, Take by mouth daily at bedtime as needed for anxiety, Disp: , Rfl:     anastrozole (ARIMIDEX) 1 mg tablet, Take 1 tablet (1 mg total) by mouth daily (Patient not taking: Reported on 9/18/2018 ), Disp: 90 tablet, Rfl: 1    cyanocobalamin (VITAMIN B-12) 1,000 mcg tablet, Take 1,000 mcg by mouth, Disp: , Rfl:     hyoscyamine (ANASPAZ,LEVSIN) 0 125 MG tablet, Take 0 125 mg by mouth every 4 (four) hours as needed for cramping, Disp: , Rfl:     Hyoscyamine Sulfate SL 0 125 MG SUBL, Place 1 tablet under the tongue 3 (three) times a day as needed, Disp: , Rfl:     pantoprazole (PROTONIX) 40 mg tablet, Take 40 mg by mouth daily, Disp: , Rfl:     psyllium (METAMUCIL) 58 6 % packet, Take 1 packet by mouth daily, Disp: , Rfl:   Allergies   Allergen Reactions    Dust Mite Extract Other (See Comments)     sneezing    Molds & Smuts Allergic Rhinitis    Shellfish Allergy GI Intolerance    Shellfish-Derived Products GI Intolerance and Vomiting     Review of Systems  Constitutional: Positive for unexpected weight change (Weight loss over past summer approx 17lbs due to GI issues)  HENT: Negative  Eyes: Negative      Respiratory: Involuntary gasp for air for several months - a few times each morning  Per pt, Dr Ilya Aguilar believes may be esophagus spasms  Cardiovascular: Negative  Gastrointestinal: Positive for constipation  Can't tolerate many foods  Eats mainly chicken  Diverticulosis and IBS  GERD  Dr Ilya Aguilar  Esophagus stretched 7/19/18  Endocrine: Negative  Genitourinary: Negative  Musculoskeletal: Negative  Skin: Negative  Allergic/Immunologic: Positive for environmental allergies  Neurological: Negative  Hematological: Negative  Psychiatric/Behavioral: Negative  OBJECTIVE:   /82   Pulse 93   Temp 98 9 °F (37 2 °C)   Resp 14   Wt 56 4 kg (124 lb 6 4 oz)   BMI 21 02 kg/m²   Pain Assessment:  0  ECOG/Zubrod/WHO: 0 - Asymptomatic    Physical Exam   Constitutional: She is oriented to person, place, and time  She appears well-developed and well-nourished  No distress  HENT:   Head: Normocephalic and atraumatic  Mouth/Throat: No oropharyngeal exudate  Eyes: Pupils are equal, round, and reactive to light  Conjunctivae and EOM are normal  No scleral icterus  Neck: Normal range of motion  Neck supple  No tracheal deviation present  No thyromegaly present  Cardiovascular: Normal rate, regular rhythm and normal heart sounds  Pulmonary/Chest: Effort normal and breath sounds normal  No respiratory distress  She has no wheezes  She has no rales  She exhibits no tenderness  Right breast exhibits no inverted nipple, no mass, no nipple discharge, no skin change and no tenderness  Left breast exhibits no inverted nipple, no mass, no nipple discharge, no skin change ( Left breast incision well healed with no induration and no masses ) and no tenderness  Abdominal: Soft  Bowel sounds are normal  She exhibits no distension and no mass  There is no tenderness  There is no rebound and no guarding  Musculoskeletal: Normal range of motion  She exhibits no edema or tenderness  Lymphadenopathy:     She has no cervical adenopathy  She has no axillary adenopathy  Right: No supraclavicular adenopathy present  Left: No supraclavicular adenopathy present  Neurological: She is alert and oriented to person, place, and time  No cranial nerve deficit  Coordination normal    Skin: Skin is warm and dry  No rash noted  She is not diaphoretic  No erythema  No pallor  Psychiatric: She has a normal mood and affect  Her behavior is normal  Judgment and thought content normal    Nursing note and vitals reviewed  RESULTS    Lab Results: No results found for this or any previous visit (from the past 672 hour(s))  Imaging Studies:Mammo Diagnostic Right W 3d & Cad    Result Date: 10/29/2018  Narrative: Patient History: Patient is postmenopausal and has 2 history of cancer in the left breast at age 62  Family history of breast cancer at age 52 in paternal aunt, premenopausal breast cancer at age 39 in paternal grandmother, prostate cancer at age 62 in father  Malignant lumpectomy of the left breast, May 10, 2017  Malignant US guidance breast biopsy left (ea add), May 10, 2017  Malignant US guided breast biopsy left, May 10, 2017  Mammo Pathology Letter of the left breast, May 10, 2017  Benign excisional biopsy of the left breast, 1997  Took hormonal contraceptives for 1 year  Patient is a former smoker, and smoked for 2 years  Patient's BMI is 21 6  Reason for exam: addl eval requested from prior study  Mammo Diagnostic Right W DBT and CAD: October 29, 2018 - Check In #: [de-identified] 2D/3D Procedure 3D views: MLO view(s) were taken of the right breast  2D views: CC view(s) were taken of the right breast  Technologist: MELANIA Todd (MELANIA)(M) Prior study comparison: April 27, 2018, mammo diagnostic bilateral W DBT and CAD performed at [address] [place] [address]  May 5, 2017, mammo diagnostic left W CAD performed at [address] [place] [address]    April 20, 2017, mammo screening bilateral W CAD, performed at [address] [place] [address]  April 14, 2016, mammo screening bilateral W CAD, performed at [address] [place] [address]  March 31, 2015, digital bilateral screening mammogram, performed at [address] [place] [address]  March 25, 2014, digital bilateral screening mammogram, performed at [address] [place] [address]  March 20, 2013, digital bilateral screening mammogram, performed at [address] [place] [address]  71-year-old woman presents for follow-up of calcifications in the right breast  Right diagnostic mammogram: Again noted is a loose grouping of calcifications in the upper central right breast, at the junction of the middle and distal thirds  These have not significantly changed since the prior exam   No suspicious mass, architectural distortion, or microcalcifications are noted  Scattered densities in the breast demonstrate long-term stability  IMPRESSION Stable calcifications in the right breast   Continued follow-up is recommended with bilateral diagnostic mammogram in 6 months when the patient is also due for her annual exam  ACR BI-RADS® Assessments: BiRad:3 - Probably Benign Recommendation: Follow-up diagnostic mammogram of both breasts in 6 months  The patient is scheduled in a reminder system for screening mammography  8-10% of cancers will be missed on mammography  Management of a palpable abnormality must be based on clinical grounds  Patients will be notified of their results via letter from our facility  Accredited by [employer] of Radiology and FDA  Transcription Location: [facility]: [id number] Risk Value(s): Myriad Table: 50 0%, MRS : Based on personal and/or family history, consideration of hereditary risk assessment may be warranted  Assessment/Plan:  No orders of the defined types were placed in this encounter         Meli Jason is a 59y o  year old female with a left breast carcinoma with stage IA, pT1b, pN0, M0 grade 2, ER positive at 100%, WA positive at 1-2% and HER-2/yanni negative invasive mammary carcinoma  She also had multiple foci of ductal carcinoma in situ  She is status post lumpectomy with negative margins and had a negative sentinel lymph node procedure  Her MammaPrint came back low risk  She was seen by Dr Donnell Obrien and recommendations were made for adjuvant hormonal therapy with anastrozole daily  In order to complete her breast conservation management, we recommended radiation therapy to the entire left breast that was completed on 9/5/17  She returns today for follow-up examination  She continues to apply a moisturizer and massage the treated left breast on a daily basis  She has no clinical nor mammographic evidence of recurrent disease  She had DEXA scan April 30, 2018 that reveals some osteoporosis in the left hip  She is on calcium with vitamin-D supplementation  She remains active with walking and exercising regularly  She had bilateral mammogram April 27, 2018 that was negative  She had right breast mammogram that was unremarkable October 29, 2018  She stop taking anastrozole July 12, 2018  because she thought this was contributing to her gastrointestinal problems that are outlined above  She continues to follow with Dr Rajiv Manzanares who she sees in December 2018  She will see surgical oncology November 14, 2018  She has follow-up in 4 weeks with Dr Donnell Obrien  She will return here for follow-up in 6 months  Oksana Hung MD  11/6/2018,3:41 PM    Portions of the record may have been created with voice recognition software   Occasional wrong word or "sound a like" substitutions may have occurred due to the inherent limitations of voice recognition software   Read the chart carefully and recognize, using context, where substitutions have occurred

## 2018-11-14 ENCOUNTER — OFFICE VISIT (OUTPATIENT)
Dept: SURGICAL ONCOLOGY | Facility: CLINIC | Age: 63
End: 2018-11-14
Payer: COMMERCIAL

## 2018-11-14 VITALS
BODY MASS INDEX: 20.83 KG/M2 | RESPIRATION RATE: 16 BRPM | TEMPERATURE: 98.8 F | HEART RATE: 100 BPM | DIASTOLIC BLOOD PRESSURE: 84 MMHG | SYSTOLIC BLOOD PRESSURE: 126 MMHG | WEIGHT: 125 LBS | HEIGHT: 65 IN

## 2018-11-14 DIAGNOSIS — Z17.0 MALIGNANT NEOPLASM OF UPPER-OUTER QUADRANT OF LEFT BREAST IN FEMALE, ESTROGEN RECEPTOR POSITIVE (HCC): Primary | ICD-10-CM

## 2018-11-14 DIAGNOSIS — C50.412 MALIGNANT NEOPLASM OF UPPER-OUTER QUADRANT OF LEFT BREAST IN FEMALE, ESTROGEN RECEPTOR POSITIVE (HCC): Primary | ICD-10-CM

## 2018-11-14 PROCEDURE — 99213 OFFICE O/P EST LOW 20 MIN: CPT | Performed by: NURSE PRACTITIONER

## 2018-11-14 NOTE — PROGRESS NOTES
Surgical Oncology Follow Up       [address] [facility] [facility]  [facility] [place] [address]  1955  [id number]  [address] [facility] [facility]  [facility] SURGICAL ONCOLOGY [facility] [phone] [id number]    Chief Complaint   Patient presents with    Follow-up     6 month follow up    Breast Cancer       Assessment/Plan:  1  Malignant neoplasm of upper-outer quadrant of left breast in female, estrogen receptor positive ([address] )  - Mammo diagnostic bilateral w 3d & cad; Future  - 6 mo f/u visit    Discussion/Summary: Patient is a 52-year-old female who presents today for a six month follow up visit for left breast cancer diagnosed in June 2017  Her pathology revealed invasive ductal carcinoma, grade 2, %, SD 1-2%, HER-2 negative  She then underwent a left lumpectomy and sentinel node biopsy by Dr Ana Wall  Her tumor was low risk on mammaprint  She completed whole breast radiation therapy on September 5, 2017  She stopped taking Anastrozole  She had a right 3d diagnostic mammogram performed on 10/29/18 which was BIRADS 3- stable calcifications in the right breast    She continues to follow with Dr Ann Kawasaki, GI for abdominal pain after eating most foods  She states her diet consists of mostly chicken, turkey and some fruit  She did have a CT of the abdomen and pelvis which did not reveal any worrisome findings  She also c/o involuntary gasps which she states only occur in the morning and this started after having an EGD  She states she is in the process of arranging to meet with a dietitian to ensure proper nutrition  She denies headaches, back pain, bone pain, cough, SOB  She states her left axillary skin cysts have resolved and I am not able to palpable any abnormalities on exam  Instructed pt to monitor and call with any concerns or new symptoms  I will order a bilateral 3d diagnostic mammogram in 6 months   We will see her back in 6 months, or sooner if the need arises  All of her questions were answered         History of Present Illness:        Malignant neoplasm of upper-outer quadrant of left breast in female, estrogen receptor positive ([facility] )    5/10/2017 Initial Diagnosis     Malignant neoplasm of upper-outer quadrant of left female breast ([facility] [address] )         5/10/2017 Biopsy     Left breast biopsy  Invasive ductal carcinoma, Grade 2   % positive, WI 1-2% positive, HER2 negative         5/30/2017 Surgery     Left lumpectomy  2 foci of Grade 2 invasive ductal carcinoma with DCIS present         5/30/2017 -  Cancer Staged     Stage IA pT1b(m) pN0         7/2017 -  Hormone Therapy     Anastrozole - Dr Quincy Beltran stopped due to reflux in July, 2018  May restart  7/25/2017 - 9/5/2017 Radiation     Treatments:  Course: C1    Plan ID Energy Fractions Dose per Fraction (cGy) Total Dose Delivered (cGy) Elapsed Days   BH L Breast 6X 25 / 25 200 5,000 34   BH L Brst e 12E 5 / 5 200 1,000 7                                                                                  Total dose 6000 cGy  Treatment Dates:  7/25/2017 - 9/5/2017               -Interval History:  Patient presents today for a six-month follow-up visit for left breast cancer diagnosed in 2017  She states that her skin cysts that were noted in the left axilla have resolved  She continues to have GI issues and continues to follow with Dr Juanis Miller  She has no new complaints today  She notices no changes on self exam  She had a right 3d diagnostic mammogram performed on 10/29/18 which was BIRADS 3- stable calcifications in the right breast      Review of Systems:  Review of Systems   Constitutional: Negative for activity change, appetite change, chills, fatigue, fever and unexpected weight change  HENT: Negative for trouble swallowing  Eyes: Negative for pain, redness and visual disturbance  Respiratory: Negative for cough, shortness of breath and wheezing  Cardiovascular: Negative for chest pain, palpitations and leg swelling  Gastrointestinal: Positive for abdominal pain  Negative for constipation, diarrhea, nausea and vomiting  Endocrine: Negative for cold intolerance and heat intolerance  Musculoskeletal: Negative for arthralgias, back pain, gait problem and myalgias  Skin: Negative for color change and rash  Neurological: Negative for dizziness, syncope, light-headedness, numbness and headaches  Hematological: Negative for adenopathy  Psychiatric/Behavioral: Negative for agitation and confusion  All other systems reviewed and are negative  Patient Active Problem List   Diagnosis    Malignant neoplasm of upper-outer quadrant of left breast in female, estrogen receptor positive ([facility] [address] )    Allergic rhinitis    Irritable bowel syndrome    Meniere's disease    Meniere's disease of right ear    Osteopenia    Osteoporosis    Seasonal allergies    Axillary lump, left     Past Medical History:   Diagnosis Date    Acid reflux     [facility]     History of external beam radiation therapy     Irritable bowel syndrome     Meniere's disease     Osteopenia of the elderly     PONV (postoperative nausea and vomiting)      Past Surgical History:   Procedure Laterality Date    APPENDECTOMY      BREAST LUMPECTOMY      BREAST SURGERY Left     biopsy    CRYOABLATION      MAMMO NEEDLE LOCALIZATION LEFT (ALL INC) Left 5/30/2017    MAMMO NEEDLE LOCALIZATION LEFT (ALL INC) EACH ADD Left 5/30/2017    KS BIOPSY/EXCISION, LYMPH NODE(S) Left 5/30/2017    Procedure: LYMPHOSCINTIGRAPHY; INTRAOPERATIVE LYMPHATIC MAPPING; SENTINEL LYMPH NODE BIOPSY (INJECT BY DR RIOS AT 0946); Surgeon: Odalis Acosta MD;  [facility];  Service: Surgical Oncology    KS PERQ DEVICE PLACEMT BREAST LOC 1ST LES W GUIDNCE Left 5/30/2017    Procedure: BREAST TWO SITE LUMPECTOMY; BREAST TWO SITE NEEDLE LOCALIZATION (TWO SITE NEEDLE LOC AT 0700);   Surgeon: Ector Maravilla Bailey Hair MD;  [facility];  Service: Surgical Oncology    TONSILLECTOMY      US GUIDANCE BREAST BIOPSY LEFT EACH ADDITIONAL Left 5/10/2017    US GUIDED BREAST BIOPSY LEFT COMPLETE Left 5/10/2017     Family History   Problem Relation Age of Onset    Lymphoma Mother     Tuberculosis Mother     Prostate cancer Father     Diabetes Father     Breast cancer Paternal Aunt     Breast cancer Paternal Grandmother      Social History     Social History    Marital status: Single     Spouse name: N/A    Number of children: N/A    Years of education: N/A     Occupational History    Not on file  Social History Main Topics    Smoking status: Former Smoker     Quit date: 1974    Smokeless tobacco: Never Used    Alcohol use No    Drug use: No    Sexual activity: Not on file     Other Topics Concern    Not on file     Social History Narrative    No narrative on file       Current Outpatient Prescriptions:     CALCIUM CITRATE PO, Take 2,000 tablets by mouth 2 (two) times daily after meals  , Disp: , Rfl:     Cholecalciferol (VITAMIN D3) 5000 units TABS, Take by mouth, Disp: , Rfl:     fexofenadine (ALLEGRA) 60 MG tablet, Take 1 tablet by mouth as needed  , Disp: , Rfl:     Multiple Vitamin (MULTIVITAMIN) tablet, Take 2 tablets by mouth daily in the early morning  , Disp: , Rfl:     nortriptyline (PAMELOR) 25 mg capsule, Take by mouth daily at bedtime, Disp: , Rfl:     Probiotic Product (PROBIOTIC-10) CAPS, Take by mouth, Disp: , Rfl:     triamterene-hydrochlorothiazide (MAXZIDE-25) 37 5-25 mg per tablet, Triamterene-HCTZ 37 5-25 MG Oral Tablet  Quantity: 100 00;   Refills: 0   Started 5-Jan-2012 Active, Disp: , Rfl:     anastrozole (ARIMIDEX) 1 mg tablet, Take 1 tablet (1 mg total) by mouth daily (Patient not taking: Reported on 11/14/2018 ), Disp: 90 tablet, Rfl: 1  Allergies   Allergen Reactions    Dust Mite Extract Other (See Comments)     sneezing    Molds & Smuts Allergic Rhinitis    Shellfish Allergy GI Intolerance    Shellfish-Derived Products GI Intolerance and Vomiting     Vitals:    11/14/18 0800   BP: 126/84   Pulse: 100   Resp: 16   Temp: 98 8 °F (37 1 °C)       Physical Exam   Constitutional: She is oriented to person, place, and time  Vital signs are normal  She appears well-developed and well-nourished  No distress  HENT:   Head: Normocephalic and atraumatic  Neck: Normal range of motion  Cardiovascular: Normal rate, regular rhythm and normal heart sounds  Pulmonary/Chest: Effort normal and breath sounds normal    Bilateral breasts were examined in the sitting and supine position  No palpable skin cysts  Left breast and left axilla surgical scar  Mild skin thickening s/p RT  There are no masses, nipple changes or nipple discharge  There is no bilateral supraclavicular or axillary lymphadenopathy noted  Abdominal: Soft  Normal appearance  She exhibits no mass  There is no hepatosplenomegaly  There is no tenderness  Musculoskeletal: Normal range of motion  Lymphadenopathy:     She has no axillary adenopathy  Right: No supraclavicular adenopathy present  Left: No supraclavicular adenopathy present  Neurological: She is alert and oriented to person, place, and time  Skin: Skin is warm, dry and intact  No rash noted  She is not diaphoretic  Psychiatric: She has a normal mood and affect  Her speech is normal    Vitals reviewed  Results:    Imaging  Mammo Diagnostic Right W 3d & Cad    Result Date: 10/29/2018  Narrative: Patient History: Patient is postmenopausal and has 2 history of cancer in the left breast at age 62  Family history of breast cancer at age 52 in paternal aunt, premenopausal breast cancer at age 39 in paternal grandmother, prostate cancer at age 62 in father  Malignant lumpectomy of the left breast, May 10, 2017  Malignant US guidance breast biopsy left (ea add), May 10, 2017  Malignant US guided breast biopsy left, May 10, 2017  Mammo Pathology Letter of the left breast, May 10, 2017  Benign excisional biopsy of the left breast, 1997  Took hormonal contraceptives for 1 year  Patient is a former smoker, and smoked for 2 years  Patient's BMI is 21 6  Reason for exam: addl eval requested from prior study  Mammo Diagnostic Right W DBT and CAD: October 29, 2018 - Check In #: [de-identified] 2D/3D Procedure 3D views: MLO view(s) were taken of the right breast  2D views: CC view(s) were taken of the right breast  Technologist: MELANIA Lezama (MELANIA)(M) Prior study comparison: April 27, 2018, mammo diagnostic bilateral W DBT and CAD performed at [address] [place] [address]  May 5, 2017, mammo diagnostic left W CAD performed at [address] [place] [address]  April 20, 2017, mammo screening bilateral W CAD, performed at [address] [place] [address]  April 14, 2016, mammo screening bilateral W CAD, performed at [address] [place] [address]  March 31, 2015, digital bilateral screening mammogram, performed at [address] [place] [address]  March 25, 2014, digital bilateral screening mammogram, performed at [address] [place] [address]  March 20, 2013, digital bilateral screening mammogram, performed at [address] [place] [address]  62-year-old woman presents for follow-up of calcifications in the right breast  Right diagnostic mammogram: Again noted is a loose grouping of calcifications in the upper central right breast, at the junction of the middle and distal thirds  These have not significantly changed since the prior exam   No suspicious mass, architectural distortion, or microcalcifications are noted  Scattered densities in the breast demonstrate long-term stability   IMPRESSION Stable calcifications in the right breast   Continued follow-up is recommended with bilateral diagnostic mammogram in 6 months when the patient is also due for her annual exam  ACR BI-RADS® Assessments: BiRad:3 - Probably Benign Recommendation: Follow-up diagnostic mammogram of both breasts in 6 months  The patient is scheduled in a reminder system for screening mammography  8-10% of cancers will be missed on mammography  Management of a palpable abnormality must be based on clinical grounds  Patients will be notified of their results via letter from our facility  Accredited by [employer] of Radiology and FDA  Transcription Location: [address] [place] [address] [place]: [id number] Risk Value(s): [employer] Table: 50 0%, MRS : Based on personal and/or family history, consideration of hereditary risk assessment may be warranted  I reviewed the above imaging data  Advance Care Planning/Advance Directives:  Discussed disease status, cancer treatment plans and/or cancer treatment goals with the patient

## 2018-11-28 ENCOUNTER — TELEPHONE (OUTPATIENT)
Dept: INFUSION CENTER | Facility: CLINIC | Age: 63
End: 2018-11-28

## 2018-11-28 ENCOUNTER — OFFICE VISIT (OUTPATIENT)
Dept: HEMATOLOGY ONCOLOGY | Facility: CLINIC | Age: 63
End: 2018-11-28
Payer: COMMERCIAL

## 2018-11-28 VITALS
RESPIRATION RATE: 16 BRPM | WEIGHT: 128 LBS | TEMPERATURE: 97.7 F | DIASTOLIC BLOOD PRESSURE: 92 MMHG | BODY MASS INDEX: 21.33 KG/M2 | HEART RATE: 86 BPM | SYSTOLIC BLOOD PRESSURE: 128 MMHG | HEIGHT: 65 IN | OXYGEN SATURATION: 97 %

## 2018-11-28 DIAGNOSIS — Z17.0 MALIGNANT NEOPLASM OF UPPER-OUTER QUADRANT OF LEFT BREAST IN FEMALE, ESTROGEN RECEPTOR POSITIVE (HCC): Primary | ICD-10-CM

## 2018-11-28 DIAGNOSIS — C50.412 MALIGNANT NEOPLASM OF UPPER-OUTER QUADRANT OF LEFT BREAST IN FEMALE, ESTROGEN RECEPTOR POSITIVE (HCC): Primary | ICD-10-CM

## 2018-11-28 PROCEDURE — 99214 OFFICE O/P EST MOD 30 MIN: CPT | Performed by: INTERNAL MEDICINE

## 2018-11-28 RX ORDER — EXEMESTANE 25 MG/1
25 TABLET ORAL DAILY
Qty: 90 TABLET | Refills: 1 | Status: SHIPPED | OUTPATIENT
Start: 2018-11-28 | End: 2019-05-21 | Stop reason: SINTOL

## 2018-11-28 NOTE — PROGRESS NOTES
Hematology / Oncology Outpatient Follow Up Note    Danielle Cisneros 59 y o  female MIKHAIL:[birth year] [facility]:[id number]         Date:  11/28/2018    Assessment / Plan:  A 59year old postmenopausal woman with left breast cancer, stage IA, grade 2, % positive, UT 1-2% positive, HER-2 negative disease  She underwent lumpectomy with sentinel lymph node biopsy resulting in SHARYN  Her tumor was found to be low risk, based on the MammaPrint  Since July 2017, she was on adjuvant hormonal therapy with anastrozole until July 2018, at which time she developed reflux disease which she attributes to anastrozole as well as Prolia  She discontinued anastrozole in July 2018  She does not wish to have Prolia injection any longer  I told her that she has option to try Reclast once a year for osteoporosis  We discussed the further management of adjuvant hormonal therapy  Recommended her to try exemestane 25 mg daily  She is in agreement with my recommendation  I will see her again in 6 months for routine follow-up           Subjective:      HPI:  A 62year old postmenopausal woman who was found to have abnormality in her left breast  Therefore, she underwent left breast biopsy in May 10, 2017, 9:00 position, 3 cm from the nipple on her left breast which showed invasive ductal carcinoma, grade 2  Subsequently, she underwent lumpectomy with sentinel lymph node biopsy by Dr Henna Shepard  She had 0 8 x 0 5 x 0 4 cm as well as 0 8 x 0 7 x 0 4 cm of invasive ductal carcinoma, grade 2  There was some lymphovascular invasion  One sentinel lymph node was negative for metastatic disease  This was % positive, UT 1-2% positive, HER-2 negative disease  Dr Henna Shepard sent her tumor tissue for MammaPrint, which came back low risk  She presented today with her daughter to discuss adjuvant treatment options  She feels well  She has no complaint of pain  She denied any respiratory symptoms  Her weight has been stable  She has osteopenia for which she was on oral bisphosphonate until a few years ago  Her most recent DEXA scan in April 2016 showed T score -2 4  She is up-to-date for colonoscopy  She has paternal grandmother as well as paternal aunt had a breast cancer  There is no first degree relative who has breast cancer  She is a lifetime never smoker  Her performance status is normal             Interval History:  A 59year-old postmenopausal woman with stage IA left breast cancer, grade 2, % positive, UT 1-2% positive, HER2 negative disease  She underwent lumpectomy with sentinel lymph node biopsy, resulting in SHARYN  Her tumor was found to be low risk, based on the MammaPrint  Therefore, adjuvant chemotherapy was not indicated  Since July 2017, she has been on adjuvant hormonal therapy with anastrozole  However, in summer of 2018, she developed severe acid reflux  She also had abdominal pain  She developed anorexia resulting in several lb of interval weight loss  She was seen by GI specialist who did EGD and colonoscopy  She thought that this was due to the anastrozole as well as Prolia injection  She stopped anastrozole in July 2018  She does not wish to have Prolia injection any longer  She presents today for follow-up  Currently, she feels well  She has some musculoskeletal symptoms  She has no complaint hot flashes  She denied respiratory symptoms  Her performance status is normal                                               Objective:      Primary Diagnosis:     Left breast cancer, stage IA(pT1b, pN0,M0) grade 2, % positive, UT 1-2% positive, HER-2 negative disease  MammaPrint low risk   Diagnosed in May 2017       Cancer Staging:  Cancer Staging  No matching staging information was found for the patient         Previous Hematologic/ Oncologic Treatment:       Adjuvant hormonal therapy with anastrozole from July 2017 through July 2018      Current Hematologic/ Oncologic Treatment:       Adjuvant hormonal therapy with exemestane 25 mg daily starting in November 2018      Disease Status:      SHARYN status post lumpectomy with sentinel lymph node biopsy       Test Results:     Pathology:     0 8 x 0 5 x 0 4 cm as well as 0 8 x 0 7 x 0 4 cm of invasive ductal carcinoma, grade 2  There is a lymphovascular invasion present  One sentinel lymph node was negative for metastatic disease  % positive, ND 1-2% positive, HER-2 negative disease  IA(pT1b, pN0,M0)  MammaPrint low risk       Radiology:     Mammography in November 2018 was probably benign  BI-RADS 3  DEXA scan in April 2018 showed T-score-2 5, consistent with osteoporosis      Laboratory:           Physical Exam:        General Appearance:    Alert, oriented          Eyes:    PERRL   Ears:    Normal external ear canals, both ears   Nose:   Nares normal, septum midline   Throat:   Mucosa moist  Pharynx without injection  Neck:   Supple         Lungs:     Clear to auscultation bilaterally   Chest Wall:    No tenderness or deformity    Heart:    Regular rate and rhythm         Abdomen:     Soft, non-tender, bowel sounds +, no organomegaly               Extremities:   Extremities no cyanosis or edema         Skin:   no rash or icterus  Lymph nodes:   Cervical, supraclavicular, and axillary nodes normal   Neurologic:   CNII-XII intact, normal strength, sensation and reflexes     Throughout             Breast exam:   lumpectomy scar upper inner quadrant of the left breast with no palpable abnormalities  Right breast exam is negative  ROS: Review of Systems   Gastrointestinal:        Acid reflux and abdominal pain  All other systems reviewed and are negative  Imaging: Mammo Diagnostic Right W 3d & Cad    Result Date: 10/29/2018  Narrative: Patient History: Patient is postmenopausal and has 2 history of cancer in the left breast at age 62   Family history of breast cancer at age 52 in paternal aunt, premenopausal breast cancer at age 39 in paternal grandmother, prostate cancer at age 62 in father  Malignant lumpectomy of the left breast, May 10, 2017  Malignant US guidance breast biopsy left (ea add), May 10, 2017  Malignant US guided breast biopsy left, May 10, 2017  Mammo Pathology Letter of the left breast, May 10, 2017  Benign excisional biopsy of the left breast, 1997  Took hormonal contraceptives for 1 year  Patient is a former smoker, and smoked for 2 years  Patient's BMI is 21 6  Reason for exam: addl eval requested from prior study  Mammo Diagnostic Right W DBT and CAD: October 29, 2018 - Check In #: [de-identified] 2D/3D Procedure 3D views: MLO view(s) were taken of the right breast  2D views: CC view(s) were taken of the right breast  Technologist: MELANIA Licona (MELANIA)(M) Prior study comparison: April 27, 2018, mammo diagnostic bilateral W DBT and CAD performed at [address] [place] [address]  May 5, 2017, mammo diagnostic left W CAD performed at [address] [place] [address]  April 20, 2017, mammo screening bilateral W CAD, performed at [address] [place] [address]  April 14, 2016, mammo screening bilateral W CAD, performed at [address] [place] [address]  March 31, 2015, digital bilateral screening mammogram, performed at [address] [place] [address]  March 25, 2014, digital bilateral screening mammogram, performed at [address] [place] [address]  March 20, 2013, digital bilateral screening mammogram, performed at [address] [place] [address]  59-year-old woman presents for follow-up of calcifications in the right breast  Right diagnostic mammogram: Again noted is a loose grouping of calcifications in the upper central right breast, at the junction of the middle and distal thirds  These have not significantly changed since the prior exam   No suspicious mass, architectural distortion, or microcalcifications are noted  Scattered densities in the breast demonstrate long-term stability   IMPRESSION Stable calcifications in the right breast   Continued follow-up is recommended with bilateral diagnostic mammogram in 6 months when the patient is also due for her annual exam  ACR BI-RADS® Assessments: BiRad:3 - Probably Benign Recommendation: Follow-up diagnostic mammogram of both breasts in 6 months  The patient is scheduled in a reminder system for screening mammography  8-10% of cancers will be missed on mammography  Management of a palpable abnormality must be based on clinical grounds  Patients will be notified of their results via letter from our facility  Accredited by [address] [place] [address] of Radiology and FDA  Transcription Location: [address] [place], [place] [address]way: [id number] Risk Value(s): [employer] Table: 50 0%, MRS : Based on personal and/or family history, consideration of hereditary risk assessment may be warranted  Labs:   Lab Results   Component Value Date    WBC 7 77 05/18/2017    HGB 13 9 05/18/2017    HCT 40 8 05/18/2017    MCV 90 05/18/2017     05/18/2017     Lab Results   Component Value Date    K 4 1 05/18/2017     05/18/2017    CO2 28 05/18/2017    BUN 21 05/18/2017    CREATININE 0 72 05/18/2017    CALCIUM 9 1 05/18/2017    AST 27 05/18/2017    ALT 44 05/18/2017    ALKPHOS 95 05/18/2017    EGFR >60 0 05/18/2017       Current Medications: Reviewed  Allergies: Reviewed  PMH/FH/SH:  Reviewed      Vital Sign:    Body surface area is 1 63 meters squared      Wt Readings from Last 3 Encounters:   11/28/18 58 1 kg (128 lb)   11/14/18 56 7 kg (125 lb)   11/06/18 56 4 kg (124 lb 6 4 oz)        Temp Readings from Last 3 Encounters:   11/28/18 97 7 °F (36 5 °C) (Tympanic)   11/14/18 98 8 °F (37 1 °C) (Oral)   11/06/18 98 9 °F (37 2 °C)        BP Readings from Last 3 Encounters:   11/28/18 128/92   11/14/18 126/84   11/06/18 138/82         Pulse Readings from Last 3 Encounters:   11/28/18 86   11/14/18 100   11/06/18 93     @LASTSAO2(3)@

## 2018-12-28 ENCOUNTER — TRANSCRIBE ORDERS (OUTPATIENT)
Dept: ADMINISTRATIVE | Facility: HOSPITAL | Age: 63
End: 2018-12-28

## 2018-12-28 DIAGNOSIS — K58.9 IRRITABLE BOWEL SYNDROME, UNSPECIFIED TYPE: Primary | ICD-10-CM

## 2019-01-03 ENCOUNTER — HOSPITAL ENCOUNTER (OUTPATIENT)
Dept: NON INVASIVE DIAGNOSTICS | Facility: CLINIC | Age: 64
Discharge: HOME | End: 2019-01-03
Payer: COMMERCIAL

## 2019-01-03 DIAGNOSIS — K58.9 IRRITABLE BOWEL SYNDROME, UNSPECIFIED TYPE: ICD-10-CM

## 2019-01-03 PROCEDURE — 93975 VASCULAR STUDY: CPT | Performed by: SURGERY

## 2019-01-03 PROCEDURE — 93975 VASCULAR STUDY: CPT

## 2019-04-29 ENCOUNTER — HOSPITAL ENCOUNTER (OUTPATIENT)
Dept: MAMMOGRAPHY | Facility: CLINIC | Age: 64
Discharge: HOME | End: 2019-04-29
Payer: COMMERCIAL

## 2019-04-29 DIAGNOSIS — Z17.0 MALIGNANT NEOPLASM OF UPPER-OUTER QUADRANT OF LEFT BREAST IN FEMALE, ESTROGEN RECEPTOR POSITIVE (HCC): ICD-10-CM

## 2019-04-29 DIAGNOSIS — C50.412 MALIGNANT NEOPLASM OF UPPER-OUTER QUADRANT OF LEFT BREAST IN FEMALE, ESTROGEN RECEPTOR POSITIVE (HCC): ICD-10-CM

## 2019-04-29 PROCEDURE — 77066 DX MAMMO INCL CAD BI: CPT

## 2019-04-29 PROCEDURE — G0279 TOMOSYNTHESIS, MAMMO: HCPCS

## 2019-05-21 ENCOUNTER — OFFICE VISIT (OUTPATIENT)
Dept: SURGICAL ONCOLOGY | Facility: CLINIC | Age: 64
End: 2019-05-21
Payer: COMMERCIAL

## 2019-05-21 ENCOUNTER — CLINICAL SUPPORT (OUTPATIENT)
Dept: RADIATION ONCOLOGY | Facility: HOSPITAL | Age: 64
End: 2019-05-21
Attending: RADIOLOGY
Payer: COMMERCIAL

## 2019-05-21 VITALS
HEART RATE: 78 BPM | RESPIRATION RATE: 16 BRPM | BODY MASS INDEX: 22.88 KG/M2 | HEIGHT: 64 IN | SYSTOLIC BLOOD PRESSURE: 134 MMHG | DIASTOLIC BLOOD PRESSURE: 82 MMHG | TEMPERATURE: 97.2 F | WEIGHT: 134 LBS

## 2019-05-21 VITALS
HEART RATE: 69 BPM | BODY MASS INDEX: 22.33 KG/M2 | DIASTOLIC BLOOD PRESSURE: 70 MMHG | RESPIRATION RATE: 16 BRPM | TEMPERATURE: 98.5 F | OXYGEN SATURATION: 98 % | SYSTOLIC BLOOD PRESSURE: 114 MMHG | HEIGHT: 65 IN | WEIGHT: 134 LBS

## 2019-05-21 DIAGNOSIS — Z17.0 MALIGNANT NEOPLASM OF UPPER-OUTER QUADRANT OF LEFT BREAST IN FEMALE, ESTROGEN RECEPTOR POSITIVE (HCC): Primary | ICD-10-CM

## 2019-05-21 DIAGNOSIS — C50.412 MALIGNANT NEOPLASM OF UPPER-OUTER QUADRANT OF LEFT BREAST IN FEMALE, ESTROGEN RECEPTOR POSITIVE (HCC): Primary | ICD-10-CM

## 2019-05-21 DIAGNOSIS — Z85.3 HISTORY OF LEFT BREAST CANCER: ICD-10-CM

## 2019-05-21 DIAGNOSIS — Z08 ENCOUNTER FOR FOLLOW-UP EXAMINATION AFTER COMPLETED TREATMENT FOR MALIGNANT NEOPLASM: Primary | ICD-10-CM

## 2019-05-21 PROCEDURE — 99213 OFFICE O/P EST LOW 20 MIN: CPT | Performed by: NURSE PRACTITIONER

## 2019-05-21 PROCEDURE — 99211 OFF/OP EST MAY X REQ PHY/QHP: CPT | Performed by: RADIOLOGY

## 2019-07-09 ENCOUNTER — OFFICE VISIT (OUTPATIENT)
Dept: HEMATOLOGY ONCOLOGY | Facility: CLINIC | Age: 64
End: 2019-07-09
Payer: COMMERCIAL

## 2019-07-09 VITALS
BODY MASS INDEX: 23.39 KG/M2 | WEIGHT: 137 LBS | SYSTOLIC BLOOD PRESSURE: 112 MMHG | HEIGHT: 64 IN | HEART RATE: 83 BPM | TEMPERATURE: 99.9 F | RESPIRATION RATE: 18 BRPM | DIASTOLIC BLOOD PRESSURE: 80 MMHG | OXYGEN SATURATION: 99 %

## 2019-07-09 DIAGNOSIS — C50.812 MALIGNANT NEOPLASM OF OVERLAPPING SITES OF LEFT BREAST IN FEMALE, ESTROGEN RECEPTOR POSITIVE (HCC): Primary | ICD-10-CM

## 2019-07-09 DIAGNOSIS — Z17.0 MALIGNANT NEOPLASM OF OVERLAPPING SITES OF LEFT BREAST IN FEMALE, ESTROGEN RECEPTOR POSITIVE (HCC): Primary | ICD-10-CM

## 2019-07-09 PROCEDURE — 99214 OFFICE O/P EST MOD 30 MIN: CPT | Performed by: INTERNAL MEDICINE

## 2019-07-09 NOTE — PROGRESS NOTES
Hematology / Oncology Outpatient Follow Up Note    Wally Castillo 59 y o  female EGK:[id number] Y:[id number]         Date:  7/9/2019    Assessment / Plan:  A 59year old postmenopausal woman with left breast cancer, stage IA, grade 2, % positive, ID 1-2% positive, HER-2 negative disease  She underwent lumpectomy with sentinel lymph node biopsy resulting in SHARYN  Her tumor was found to be low risk, based on the MammaPrint  Since July 2017, she was on adjuvant hormonal therapy with anastrozole until July 2018, at which time she developed reflux disease which she attributes to anastrozole as well as Prolia  She discontinued anastrozole and in July 2018  She does not wish to be on Reclast   She tried exemestane for a month with same side effect  Therefore, she discontinued exemestane in December 2018  She is currently on observation  Clinically, she has no evidence recurrent disease  She does not wish to take any other adjuvant hormonal therapy  Because of her low risk disease, this is acceptable decision  I recommended her to be followed by Dr Milo Cain, since she is not going to take any hormonal therapy  She is in agreement with my recommendation  She may see me p r n  Basis from now on        Subjective:      HPI:  A 62year old postmenopausal woman who was found to have abnormality in her left breast  Therefore, she underwent left breast biopsy in May 10, 2017, 9:00 position, 3 cm from the nipple on her left breast which showed invasive ductal carcinoma, grade 2  Subsequently, she underwent lumpectomy with sentinel lymph node biopsy by Dr Milo Cain  She had 0 8 x 0 5 x 0 4 cm as well as 0 8 x 0 7 x 0 4 cm of invasive ductal carcinoma, grade 2  There was some lymphovascular invasion  One sentinel lymph node was negative for metastatic disease  This was % positive, ID 1-2% positive, HER-2 negative disease  Dr Milo Cain sent her tumor tissue for MammaPrint, which came back low risk   She presented today with her daughter to discuss adjuvant treatment options  She feels well  She has no complaint of pain  She denied any respiratory symptoms  Her weight has been stable  She has osteopenia for which she was on oral bisphosphonate until a few years ago  Her most recent DEXA scan in April 2016 showed T score -2 4  She is up-to-date for colonoscopy  She has paternal grandmother as well as paternal aunt had a breast cancer  There is no first degree relative who has breast cancer  She is a lifetime never smoker  Her performance status is normal             Interval History:  A 59year-old postmenopausal woman with stage IA left breast cancer, grade 2, % positive, OR 1-2% positive, HER2 negative disease  She underwent lumpectomy with sentinel lymph node biopsy, resulting in SHARYN  Her tumor was found to be low risk, based on the MammaPrint  Therefore, adjuvant chemotherapy was not indicated  Since July 2017, she has been on adjuvant hormonal therapy with anastrozole  However, in summer of 2018, she developed severe acid reflux  She also had abdominal pain  She developed anorexia resulting in several lb of interval weight loss  She was seen by GI specialist who did EGD and colonoscopy  She thought that this was due to the anastrozole as well as Prolia injection  She stopped anastrozole in July 2018  She discontinued Prolia injection  She does not wish to try Reclast   However, she was agreeable to try exemestane which she was on until November 2018  However, same side effects occur, including reflux disease in severe musculoskeletal symptoms  Therefore, she discontinued exemestane  Currently, she is on observation  She came in today for follow-up  She feels well  She has no respiratory symptoms  Her weight is stable  She had mammography in April 2019 which was benign    Her performance status is normal                                            Objective:      Primary Diagnosis:     Left breast cancer, stage IA(pT1b, pN0,M0) grade 2, % positive, NJ 1-2% positive, HER-2 negative disease  MammaPrint low risk  Diagnosed in May 2017       Cancer Staging:  Cancer Staging  No matching staging information was found for the patient         Previous Hematologic/ Oncologic Treatment:       1  Adjuvant hormonal therapy with anastrozole from July 2017 through July 2018  2  Adjuvant hormonal therapy with exemestane from November 2018 through December 2018       Current Hematologic/ Oncologic Treatment:       Observation      Disease Status:      SHARYN status post lumpectomy with sentinel lymph node biopsy       Test Results:     Pathology:     0 8 x 0 5 x 0 4 cm as well as 0 8 x 0 7 x 0 4 cm of invasive ductal carcinoma, grade 2  There is a lymphovascular invasion present  One sentinel lymph node was negative for metastatic disease  % positive, NJ 1-2% positive, HER-2 negative disease  IA(pT1b, pN0,M0)  MammaPrint low risk       Radiology:     Mammography in November 2018 was probably benign   BI-RADS 3  DEXA scan in April 2018 showed T-score-2 5, consistent with osteoporosis      Laboratory:           Physical Exam:        General Appearance:    Alert, oriented          Eyes:    PERRL   Ears:    Normal external ear canals, both ears   Nose:   Nares normal, septum midline   Throat:   Mucosa moist  Pharynx without injection  Neck:   Supple         Lungs:     Clear to auscultation bilaterally   Chest Wall:    No tenderness or deformity    Heart:    Regular rate and rhythm         Abdomen:     Soft, non-tender, bowel sounds +, no organomegaly               Extremities:   Extremities no cyanosis or edema         Skin:   no rash or icterus  Lymph nodes:   Cervical, supraclavicular, and axillary nodes normal   Neurologic:   CNII-XII intact, normal strength, sensation and reflexes     Throughout             Breast exam:   lumpectomy scar upper inner quadrant of the left breast with no palpable abnormalities   Right breast exam is negative  ROS: Review of Systems   All other systems reviewed and are negative  Imaging: No results found  Labs:   Lab Results   Component Value Date    WBC 7 77 05/18/2017    HGB 13 9 05/18/2017    HCT 40 8 05/18/2017    MCV 90 05/18/2017     05/18/2017     Lab Results   Component Value Date    K 4 1 05/18/2017     05/18/2017    CO2 28 05/18/2017    BUN 21 05/18/2017    CREATININE 0 72 05/18/2017    CALCIUM 9 1 05/18/2017    AST 27 05/18/2017    ALT 44 05/18/2017    ALKPHOS 95 05/18/2017    EGFR >60 0 05/18/2017         Current Medications: Reviewed  Allergies: Reviewed  PMH/FH/SH:  Reviewed      Vital Sign:    Body surface area is 1 67 meters squared      Wt Readings from Last 3 Encounters:   07/09/19 62 1 kg (137 lb)   05/21/19 60 8 kg (134 lb)   05/21/19 60 8 kg (134 lb)        Temp Readings from Last 3 Encounters:   07/09/19 99 9 °F (37 7 °C) (Tympanic Core)   05/21/19 (!) 97 2 °F (36 2 °C)   05/21/19 98 5 °F (36 9 °C) (Temporal)        BP Readings from Last 3 Encounters:   07/09/19 112/80   05/21/19 134/82   05/21/19 114/70         Pulse Readings from Last 3 Encounters:   07/09/19 83   05/21/19 78   05/21/19 69     @LASTSAO2(3)@

## 2019-11-06 ENCOUNTER — OFFICE VISIT (OUTPATIENT)
Dept: SURGICAL ONCOLOGY | Facility: CLINIC | Age: 64
End: 2019-11-06
Payer: COMMERCIAL

## 2019-11-06 VITALS
HEIGHT: 64 IN | DIASTOLIC BLOOD PRESSURE: 80 MMHG | SYSTOLIC BLOOD PRESSURE: 120 MMHG | BODY MASS INDEX: 23.73 KG/M2 | RESPIRATION RATE: 16 BRPM | WEIGHT: 139 LBS | HEART RATE: 64 BPM | TEMPERATURE: 98.1 F

## 2019-11-06 DIAGNOSIS — Z08 ENCOUNTER FOR FOLLOW-UP EXAMINATION AFTER COMPLETED TREATMENT FOR MALIGNANT NEOPLASM: Primary | ICD-10-CM

## 2019-11-06 DIAGNOSIS — Z85.3 HISTORY OF LEFT BREAST CANCER: ICD-10-CM

## 2019-11-06 PROCEDURE — 99213 OFFICE O/P EST LOW 20 MIN: CPT | Performed by: NURSE PRACTITIONER

## 2019-11-06 NOTE — PROGRESS NOTES
Surgical Oncology Follow Up       [address]  [facility] [place]  [address] [facility] [address]    Indira Mock  1955  [id number]  [address] [facility]  [facility] [place]  [address] [facility] [address]    Chief Complaint   Patient presents with    Breast Cancer     Pt is here for a six month follow up       Assessment/Plan:  1  Encounter for follow-up examination after completed treatment for malignant neoplasm    2  History of left breast cancer  - Mammo diagnostic bilateral w 3d & cad; Future  - 6 mo f/u visit     Discussion/Summary: Patient is a 45-year-old female who presents today for a six month follow up visit for left breast cancer diagnosed in June 2017  Her pathology revealed invasive ductal carcinoma, grade 2, %, SC 1-2%, HER-2 negative  She underwent a left lumpectomy and sentinel node biopsy by Dr Batista Fresh  Her tumor was low risk on mammaprint  She completed whole breast radiation therapy  She was taking an aromatase inhibitor however stopped secondary to side effects  She had a bilateral 3d diagnostic mammogram on 4/29/19 which was BIRADS 3- stable, probably benign calcifications- recommended next mammogram in 1 year  She has no new complaints today and there are no concerns on today's exam   I will order a bilateral diagnostic mammogram to be performed in April and we will plan to see the patient back in 6 months or sooner if the need arises  She was instructed to call with any new concerns or symptoms prior to that time  All of her questions were answered today  History of Present Illness:        History of left breast cancer    5/10/2017 Initial Diagnosis     Malignant neoplasm of upper-outer quadrant of left female breast ([facility] [address] )      5/10/2017 Biopsy     Left breast biopsy  Invasive ductal carcinoma, Grade 2     % positive, SC 1-2% positive, HER2 negative      5/30/2017 Surgery Left lumpectomy  2 foci of Grade 2 invasive ductal carcinoma with DCIS present      5/30/2017 -  Cancer Staged     Stage IA pT1b(m) pN0      7/2017 -  Hormone Therapy     Anastrozole - Dr Edwina Powers stopped due to reflux in July, 2018  May restart  7/25/2017 - 9/5/2017 Radiation     Treatments:  Course: C1    Plan ID Energy Fractions Dose per Fraction (cGy) Total Dose Delivered (cGy) Elapsed Days   BH L Breast 6X 25 / 25 200 5,000 34   BH L Brst e 12E 5 / 5 200 1,000 7                                                                                  Total dose 6000 cGy  Treatment Dates:  7/25/2017 - 9/5/2017         Hormone Therapy     Exemestane? ?          -Interval History:  Patient presents today for a six-month follow-up visit for left breast cancer diagnosed in 2017  She notices no changes on self-exam   She denies headaches, back pain, bone pain, cough, shortness of breath, abdominal pain  She states that her abdominal/eating issues continue to improve and she is able tolerate more foods  Review of Systems:  Review of Systems   Constitutional: Negative for activity change, appetite change, chills, fatigue, fever and unexpected weight change  HENT: Negative for trouble swallowing  Eyes: Negative for pain, redness and visual disturbance  Respiratory: Negative for cough, shortness of breath and wheezing  Cardiovascular: Negative for chest pain, palpitations and leg swelling  Gastrointestinal: Negative for abdominal pain, constipation, diarrhea, nausea and vomiting  Endocrine: Negative for cold intolerance and heat intolerance  Musculoskeletal: Negative for arthralgias, back pain, gait problem and myalgias  Skin: Negative for color change and rash  Neurological: Negative for dizziness, syncope, light-headedness, numbness and headaches  Hematological: Negative for adenopathy  Psychiatric/Behavioral: Negative for agitation and confusion     All other systems reviewed and are negative  Patient Active Problem List   Diagnosis    History of left breast cancer    Allergic rhinitis    Irritable bowel syndrome    Meniere's disease    Meniere's disease of right ear    Osteopenia    Osteoporosis    Seasonal allergies    Axillary lump, left    Encounter for follow-up examination after completed treatment for malignant neoplasm     Past Medical History:   Diagnosis Date    Acid reflux     Breast cancer ([address] )     History of external beam radiation therapy     History of radiation therapy 2017    30 TREATMENTS    Irritable bowel syndrome     Meniere's disease     Osteopenia of the elderly     PONV (postoperative nausea and vomiting)      Past Surgical History:   Procedure Laterality Date    APPENDECTOMY      BREAST BIOPSY Left 2017    ultrasound guided postive    BREAST EXCISIONAL BIOPSY Left 1997    benign    BREAST LUMPECTOMY Left 2017    cancer    BREAST SURGERY Left     biopsy    CRYOABLATION      MAMMO NEEDLE LOCALIZATION LEFT (ALL INC) Left 5/30/2017    MAMMO NEEDLE LOCALIZATION LEFT (ALL INC) EACH ADD Left 5/30/2017    ND BIOPSY/EXCISION, LYMPH NODE(S) Left 5/30/2017    Procedure: LYMPHOSCINTIGRAPHY; INTRAOPERATIVE LYMPHATIC MAPPING; SENTINEL LYMPH NODE BIOPSY (INJECT BY DR RIOS AT 1713); Surgeon: Carlos Pandey MD;  [facility];  Service: Surgical Oncology    ND PERQ DEVICE PLACEMT BREAST LOC 1ST LES W GUIDNCE Left 5/30/2017    Procedure: BREAST TWO SITE LUMPECTOMY; BREAST TWO SITE NEEDLE LOCALIZATION (TWO SITE NEEDLE LOC AT 0700);   Surgeon: Carlos Pandey MD;  [facility];  Service: Surgical Oncology    TONSILLECTOMY      US GUIDANCE BREAST BIOPSY LEFT EACH ADDITIONAL Left 5/10/2017    US GUIDED BREAST BIOPSY LEFT COMPLETE Left 5/10/2017     Family History   Problem Relation Age of Onset    Lymphoma Mother     Tuberculosis Mother     Prostate cancer Father     Diabetes Father     Breast cancer Paternal Aunt     Breast cancer Paternal Grandmother      Social History     Socioeconomic History    Marital status: Single     Spouse name: Not on file    Number of children: 2    Years of education: Not on file    Highest education level: Not on file   Occupational History    Not on file   Social Needs    Financial resource strain: Not on file    Food insecurity:     Worry: Not on file     Inability: Not on file    Transportation needs:     Medical: Not on file     Non-medical: Not on file   Tobacco Use    Smoking status: Former Smoker     Last attempt to quit: 1974     Years since quittin 8    Smokeless tobacco: Never Used   Substance and Sexual Activity    Alcohol use: No    Drug use: No    Sexual activity: Not on file   Lifestyle    Physical activity:     Days per week: Not on file     Minutes per session: Not on file    Stress: Not on file   Relationships    Social connections:     Talks on phone: Not on file     Gets together: Not on file     Attends Gnosticism service: Not on file     Active member of club or organization: Not on file     Attends meetings of clubs or organizations: Not on file     Relationship status: Not on file    Intimate partner violence:     Fear of current or ex partner: Not on file     Emotionally abused: Not on file     Physically abused: Not on file     Forced sexual activity: Not on file   Other Topics Concern    Not on file   Social History Narrative    Not on file       Current Outpatient Medications:     CALCIUM CITRATE PO, Take 2,000 tablets by mouth 2 (two) times daily after meals  , Disp: , Rfl:     Cholecalciferol (VITAMIN D3) 5000 units TABS, Take by mouth, Disp: , Rfl:     fexofenadine (ALLEGRA) 60 MG tablet, Take 1 tablet by mouth as needed  , Disp: , Rfl:     Multiple Vitamin (MULTIVITAMIN) tablet, Take 2 tablets by mouth daily in the early morning  , Disp: , Rfl:     Probiotic Product (PROBIOTIC-10) CAPS, Take by mouth, Disp: , Rfl:     triamterene-hydrochlorothiazide (MAXZIDE-25) 37 5-25 mg per tablet, Triamterene-HCTZ 37 5-25 MG Oral Tablet  Quantity: 100 00; Refills: 0   Started 5-Jan-2012 Active, Disp: , Rfl:     nortriptyline (PAMELOR) 25 mg capsule, Take by mouth daily at bedtime, Disp: , Rfl:   Allergies   Allergen Reactions    Dust Mite Extract Other (See Comments)     sneezing    Molds & Smuts Allergic Rhinitis    Shellfish Allergy GI Intolerance    Shellfish-Derived Products GI Intolerance and Vomiting     Vitals:    11/06/19 1516   BP: 120/80   Pulse: 64   Resp: 16   Temp: 98 1 °F (36 7 °C)       Physical Exam   Constitutional: She is oriented to person, place, and time  Vital signs are normal  She appears well-developed and well-nourished  No distress  HENT:   Head: Normocephalic and atraumatic  Neck: Normal range of motion  Cardiovascular: Normal rate, regular rhythm and normal heart sounds  Pulmonary/Chest: Effort normal and breath sounds normal    Bilateral breasts were examined in the sitting and supine position  Left breast and left axilla surgical scar  Mild skin thickening s/p RT  There are no masses, nipple changes or nipple discharge  There is no bilateral supraclavicular or axillary lymphadenopathy noted  Abdominal: Soft  Normal appearance  She exhibits no mass  There is no hepatosplenomegaly  There is no tenderness  Musculoskeletal: Normal range of motion  Lymphadenopathy:     She has no axillary adenopathy  Right: No supraclavicular adenopathy present  Left: No supraclavicular adenopathy present  Neurological: She is alert and oriented to person, place, and time  Skin: Skin is warm, dry and intact  No rash noted  She is not diaphoretic  Psychiatric: She has a normal mood and affect  Her speech is normal    Vitals reviewed  Advance Care Planning/Advance Directives:  Discussed disease status, cancer treatment plans and/or cancer treatment goals with the patient

## 2020-09-22 ENCOUNTER — HOSPITAL ENCOUNTER (OUTPATIENT)
Dept: MAMMOGRAPHY | Facility: CLINIC | Age: 65
Discharge: HOME | End: 2020-09-22
Payer: COMMERCIAL

## 2020-09-22 ENCOUNTER — TELEPHONE (OUTPATIENT)
Dept: GYNECOLOGIC ONCOLOGY | Facility: CLINIC | Age: 65
End: 2020-09-22

## 2020-09-22 VITALS — HEIGHT: 64 IN | WEIGHT: 139 LBS | BODY MASS INDEX: 23.73 KG/M2

## 2020-09-22 DIAGNOSIS — Z85.3 HISTORY OF LEFT BREAST CANCER: ICD-10-CM

## 2020-09-22 PROCEDURE — G0279 TOMOSYNTHESIS, MAMMO: HCPCS

## 2020-09-22 PROCEDURE — 77066 DX MAMMO INCL CAD BI: CPT

## 2020-09-22 NOTE — TELEPHONE ENCOUNTER
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

## 2020-09-23 ENCOUNTER — TELEPHONE (OUTPATIENT)
Dept: SURGICAL ONCOLOGY | Facility: CLINIC | Age: 65
End: 2020-09-23

## 2020-09-23 ENCOUNTER — TELEPHONE (OUTPATIENT)
Dept: HEMATOLOGY ONCOLOGY | Facility: CLINIC | Age: 65
End: 2020-09-23

## 2020-09-23 NOTE — TELEPHONE ENCOUNTER
Patient is returning your call to discuss possible appointment times  Patient's only request was [facility] location  She said you can call her back any time on her cell phone   [phone]

## 2020-09-24 ENCOUNTER — TELEPHONE (OUTPATIENT)
Dept: SURGICAL ONCOLOGY | Facility: CLINIC | Age: 65
End: 2020-09-24

## 2021-03-31 ENCOUNTER — OFFICE VISIT (OUTPATIENT)
Dept: SURGICAL ONCOLOGY | Facility: CLINIC | Age: 66
End: 2021-03-31
Payer: COMMERCIAL

## 2021-03-31 VITALS
DIASTOLIC BLOOD PRESSURE: 100 MMHG | RESPIRATION RATE: 16 BRPM | HEIGHT: 64 IN | BODY MASS INDEX: 24.07 KG/M2 | SYSTOLIC BLOOD PRESSURE: 160 MMHG | TEMPERATURE: 97.3 F | WEIGHT: 141 LBS | HEART RATE: 72 BPM

## 2021-03-31 DIAGNOSIS — Z85.3 HISTORY OF LEFT BREAST CANCER: ICD-10-CM

## 2021-03-31 DIAGNOSIS — Z08 ENCOUNTER FOR FOLLOW-UP EXAMINATION AFTER COMPLETED TREATMENT FOR MALIGNANT NEOPLASM: Primary | ICD-10-CM

## 2021-03-31 PROCEDURE — 99213 OFFICE O/P EST LOW 20 MIN: CPT | Performed by: NURSE PRACTITIONER

## 2021-03-31 RX ORDER — IBUPROFEN 600 MG/1
600 TABLET ORAL EVERY 6 HOURS PRN
COMMUNITY
Start: 2021-03-04 | End: 2022-03-04

## 2021-03-31 RX ORDER — CYCLOBENZAPRINE HCL 5 MG
TABLET ORAL
COMMUNITY
Start: 2021-02-15 | End: 2021-11-10

## 2021-03-31 NOTE — PROGRESS NOTES
Surgical Oncology Follow Up       50 [facility],[address] [place] [address]  CANCER CARE [facility] SURGICAL ONCOLOGY [place]  [address] [facility] [address]    Eliceo Jennings  1955  [id number]  [address] [facility],[address] [place] [address] SURGICAL ONCOLOGY [place]  [address] [facility] [address]    Chief Complaint   Patient presents with    Breast Cancer     Pt is here for 1 year f/u       Assessment/Plan:  1  Encounter for follow-up examination after completed treatment for malignant neoplasm    2  History of left breast cancer  - Mammo diagnostic bilateral w 3d & cad; Future  - 6 mo f/u visit    Discussion/Summary: Patient is a 61-year-old female who presents today for a six month follow up visit for left breast cancer diagnosed in June 2017  Her pathology revealed invasive ductal carcinoma, grade 2, %, TN 1-2%, HER-2 negative  She underwent a left lumpectomy and sentinel node biopsy by Dr Sandip Jarvis  Her tumor was low risk on mammaprint  She completed whole breast radiation therapy  She was taking an aromatase inhibitor however stopped secondary to side effects  She had a bilateral 3d diagnostic mammogram on September 22, 2020 which was BI-RADS 2, category 1 density  Her only complaints today are back pain with right arm pain  She has been diagnosed with cervical stenosis and bulged discs  She is following with a specialist and has received an epidural injection  There was no evidence of metastatic disease on MRI  Otherwise she offers no new complaints today and there are no concerns on today's exam   I will order a bilateral mammogram for September and I will plan to see the patient back in 6 months or sooner should the need arise  She was instructed to call with any new concerns or symptoms prior to that time  All of her questions were answered today      History of Present Illness:     Oncology History   History of left breast cancer   5/10/2017 Initial Diagnosis    Malignant neoplasm of upper-outer quadrant of left female breast ([facility] [address] )     5/10/2017 Biopsy    Left breast biopsy  Invasive ductal carcinoma, Grade 2   % positive, FL 1-2% positive, HER2 negative     5/30/2017 Surgery    Left lumpectomy  2 foci of Grade 2 invasive ductal carcinoma with DCIS present     5/30/2017 -  Cancer Staged    Stage IA pT1b(m) pN0     7/2017 -  Hormone Therapy    Anastrozole - Dr Andrés Miller stopped due to reflux in July, 2018  May restart  7/25/2017 - 9/5/2017 Radiation    Treatments:  Course: C1    Plan ID Energy Fractions Dose per Fraction (cGy) Total Dose Delivered (cGy) Elapsed Days   BH L Breast 6X 25 / 25 200 5,000 34   BH L Brst e 12E 5 / 5 200 1,000 7                                                                                  Total dose 6000 cGy  Treatment Dates:  7/25/2017 - 9/5/2017        Hormone Therapy    Exemestane? ?          -Interval History:  Patient notices no changes on her self breast exam   She does report that she has a bulge disc in her neck which is causing back pain and right arm pain  She is following with the specialist and has received an epidural injection  She denies any other persistent headaches, cough, shortness of breath, abdominal pain  Review of Systems:  Review of Systems   Constitutional: Negative for activity change, appetite change, chills, fatigue, fever and unexpected weight change  Respiratory: Negative for cough and shortness of breath  Cardiovascular: Negative for chest pain  Gastrointestinal: Negative for abdominal pain, constipation, diarrhea, nausea and vomiting  Musculoskeletal: Positive for back pain  Negative for arthralgias, gait problem and myalgias  Skin: Negative for color change and rash  Neurological: Negative for dizziness and headaches  Hematological: Negative for adenopathy  Psychiatric/Behavioral: Negative for agitation and confusion  All other systems reviewed and are negative        Patient Active Problem List Diagnosis    History of left breast cancer    Allergic rhinitis    Irritable bowel syndrome    Meniere's disease    Meniere's disease of right ear    Osteopenia    Osteoporosis    Seasonal allergies    Axillary lump, left    Encounter for follow-up examination after completed treatment for malignant neoplasm     Past Medical History:   Diagnosis Date    Acid reflux     Breast cancer ([address] )     History of external beam radiation therapy     History of radiation therapy 2017    30 TREATMENTS    Irritable bowel syndrome     Meniere's disease     Osteopenia of the elderly     PONV (postoperative nausea and vomiting)      Past Surgical History:   Procedure Laterality Date    APPENDECTOMY      BREAST BIOPSY Left 2017    ultrasound guided postive    BREAST EXCISIONAL BIOPSY Left 1997    benign    BREAST LUMPECTOMY Left 2017    cancer    BREAST SURGERY Left     biopsy    CRYOABLATION      MAMMO NEEDLE LOCALIZATION LEFT (ALL INC) Left 5/30/2017    MAMMO NEEDLE LOCALIZATION LEFT (ALL INC) EACH ADD Left 5/30/2017    CA BIOPSY/EXCISION, LYMPH NODE(S) Left 5/30/2017    Procedure: LYMPHOSCINTIGRAPHY; INTRAOPERATIVE LYMPHATIC MAPPING; SENTINEL LYMPH NODE BIOPSY (INJECT BY DR RIOS AT 7256); Surgeon: Marlen Chau MD;  [facility];  Service: Surgical Oncology    CA PERQ DEVICE PLACEMT BREAST LOC 1ST LES W GUIDNCE Left 5/30/2017    Procedure: BREAST TWO SITE LUMPECTOMY; BREAST TWO SITE NEEDLE LOCALIZATION (TWO SITE NEEDLE LOC AT 0700);   Surgeon: Marlen Chau MD;  [facility];  Service: Surgical Oncology    TONSILLECTOMY      US GUIDANCE BREAST BIOPSY LEFT EACH ADDITIONAL Left 5/10/2017    US GUIDED BREAST BIOPSY LEFT COMPLETE Left 5/10/2017     Family History   Problem Relation Age of Onset    Lymphoma Mother     Tuberculosis Mother     Prostate cancer Father     Diabetes Father     Breast cancer Paternal Aunt     Breast cancer Paternal Grandmother      Social History Socioeconomic History    Marital status: Single     Spouse name: Not on file    Number of children: 2    Years of education: Not on file    Highest education level: Not on file   Occupational History    Not on file   Social Needs    Financial resource strain: Not on file    Food insecurity     Worry: Not on file     Inability: Not on file    Transportation needs     Medical: Not on file     Non-medical: Not on file   Tobacco Use    Smoking status: Former Smoker     Quit date: 1974     Years since quittin 3    Smokeless tobacco: Never Used   Substance and Sexual Activity    Alcohol use: No    Drug use: No    Sexual activity: Not on file   Lifestyle    Physical activity     Days per week: Not on file     Minutes per session: Not on file    Stress: Not on file   Relationships    Social connections     Talks on phone: Not on file     Gets together: Not on file     Attends Latter day service: Not on file     Active member of club or organization: Not on file     Attends meetings of clubs or organizations: Not on file     Relationship status: Not on file    Intimate partner violence     Fear of current or ex partner: Not on file     Emotionally abused: Not on file     Physically abused: Not on file     Forced sexual activity: Not on file   Other Topics Concern    Not on file   Social History Narrative    Not on file       Current Outpatient Medications:     CALCIUM CITRATE PO, Take 2,000 tablets by mouth 2 (two) times daily after meals  , Disp: , Rfl:     Cholecalciferol (VITAMIN D3) 5000 units TABS, Take by mouth, Disp: , Rfl:     cyclobenzaprine (FLEXERIL) 5 mg tablet, take 1 to 2 tablets by mouth three times a day AS NEEDED FOR MUSCLE SPASMS, Disp: , Rfl:     fexofenadine (ALLEGRA) 60 MG tablet, Take 1 tablet by mouth as needed  , Disp: , Rfl:     ibuprofen (MOTRIN) 600 mg tablet, Take 600 mg by mouth every 6 (six) hours as needed, Disp: , Rfl:     Multiple Vitamin (MULTIVITAMIN) tablet, Take 2 tablets by mouth daily in the early morning  , Disp: , Rfl:     Probiotic Product (PROBIOTIC-10) CAPS, Take by mouth, Disp: , Rfl:     triamterene-hydrochlorothiazide (MAXZIDE-25) 37 5-25 mg per tablet, Triamterene-HCTZ 37 5-25 MG Oral Tablet  Quantity: 100 00; Refills: 0   Started 5-Jan-2012 Active, Disp: , Rfl:     nortriptyline (PAMELOR) 25 mg capsule, Take by mouth daily at bedtime, Disp: , Rfl:   Allergies   Allergen Reactions    Dust Mite Extract Other (See Comments)     sneezing    Molds & Smuts Allergic Rhinitis    Shellfish Allergy - Food Allergy GI Intolerance    Shellfish-Derived Products - Food Allergy GI Intolerance and Vomiting     Vitals:    03/31/21 1525   BP: 160/100   Pulse: 72   Resp: 16   Temp: (!) 97 3 °F (36 3 °C)       Physical Exam  Vitals signs reviewed  Constitutional:       General: She is not in acute distress  Appearance: Normal appearance  She is well-developed  She is not diaphoretic  HENT:      Head: Normocephalic and atraumatic  Neck:      Musculoskeletal: Normal range of motion  Cardiovascular:      Rate and Rhythm: Normal rate and regular rhythm  Heart sounds: Normal heart sounds  Pulmonary:      Effort: Pulmonary effort is normal       Breath sounds: Normal breath sounds  Chest:      Breasts:         Right: No swelling, bleeding, inverted nipple, mass, nipple discharge, skin change or tenderness  Left: Skin change (Surgical scar breast and axilla) present  No swelling, bleeding, inverted nipple, mass, nipple discharge or tenderness  Abdominal:      Palpations: Abdomen is soft  There is no mass  Tenderness: There is no abdominal tenderness  Musculoskeletal: Normal range of motion  Lymphadenopathy:      Upper Body:      Right upper body: No supraclavicular or axillary adenopathy  Left upper body: No supraclavicular or axillary adenopathy  Skin:     General: Skin is warm and dry  Findings: No rash  Neurological:      Mental Status: She is alert and oriented to person, place, and time  Psychiatric:         Speech: Speech normal          Advance Care Planning/Advance Directives:  Discussed disease status, cancer treatment plans and/or cancer treatment goals with the patient

## 2021-09-23 ENCOUNTER — HOSPITAL ENCOUNTER (OUTPATIENT)
Dept: MAMMOGRAPHY | Facility: CLINIC | Age: 66
Discharge: HOME | End: 2021-09-23
Payer: COMMERCIAL

## 2021-09-23 VITALS — HEIGHT: 64 IN | BODY MASS INDEX: 23.73 KG/M2 | WEIGHT: 139 LBS

## 2021-09-23 DIAGNOSIS — Z85.3 HISTORY OF LEFT BREAST CANCER: ICD-10-CM

## 2021-09-23 PROCEDURE — 77066 DX MAMMO INCL CAD BI: CPT

## 2021-09-23 PROCEDURE — G0279 TOMOSYNTHESIS, MAMMO: HCPCS

## 2021-11-10 ENCOUNTER — OFFICE VISIT (OUTPATIENT)
Dept: SURGICAL ONCOLOGY | Facility: CLINIC | Age: 66
End: 2021-11-10
Payer: COMMERCIAL

## 2021-11-10 VITALS
TEMPERATURE: 97.3 F | OXYGEN SATURATION: 99 % | DIASTOLIC BLOOD PRESSURE: 80 MMHG | WEIGHT: 140 LBS | HEIGHT: 64 IN | BODY MASS INDEX: 23.9 KG/M2 | HEART RATE: 92 BPM | RESPIRATION RATE: 17 BRPM | SYSTOLIC BLOOD PRESSURE: 116 MMHG

## 2021-11-10 DIAGNOSIS — Z85.3 HISTORY OF LEFT BREAST CANCER: ICD-10-CM

## 2021-11-10 DIAGNOSIS — Z08 ENCOUNTER FOR FOLLOW-UP EXAMINATION AFTER COMPLETED TREATMENT FOR MALIGNANT NEOPLASM: Primary | ICD-10-CM

## 2021-11-10 PROCEDURE — 99213 OFFICE O/P EST LOW 20 MIN: CPT | Performed by: NURSE PRACTITIONER

## 2022-03-09 ENCOUNTER — TELEPHONE (OUTPATIENT)
Dept: GYNECOLOGIC ONCOLOGY | Facility: CLINIC | Age: 67
End: 2022-03-09

## 2022-03-09 ENCOUNTER — TELEPHONE (OUTPATIENT)
Dept: HEMATOLOGY ONCOLOGY | Facility: CLINIC | Age: 67
End: 2022-03-09

## 2022-03-09 NOTE — TELEPHONE ENCOUNTER
Left Voicemail for patient to call back and r/s their appointment with Mac Gilmore at [facility]  If patient is a BREAST PATIENT:   -they can see Audelia Cobb at [facility] or Illa All at [facility]     If patient is a NONBREAST PATIENT:   -they can see Paola at [facility] or Audelia Cobb at [facility] on Fridays

## 2022-03-09 NOTE — TELEPHONE ENCOUNTER
Appointment Cancellation Or Reschedule     Person calling in Patient    Provider Jaki Gutierrez   Office Visit Date and Time 5/17/22 @ 3pm   Office Visit Location [facility]   Did patient want to reschedule their office appointment? If so, when was it scheduled to? Yes 5/17/22 @ 3:30pm    Is this patient calling to reschedule an infusion appointment? no   When is their next infusion appointment? na   Is this patient a Chemo patient? no   Reason for Cancellation or Reschedule Patient wants to see Shirni Mao in [facility]     If the patient is a treatment patient, please route this to the office nurse  If the patient is not on treatment, please route to the office MA

## 2022-03-18 ENCOUNTER — NEW PATIENT (OUTPATIENT)
Dept: URBAN - METROPOLITAN AREA CLINIC 6 | Facility: CLINIC | Age: 67
End: 2022-03-18

## 2022-03-18 DIAGNOSIS — H25.813: ICD-10-CM

## 2022-03-18 DIAGNOSIS — H43.391: ICD-10-CM

## 2022-03-18 DIAGNOSIS — G43.B0: ICD-10-CM

## 2022-03-18 DIAGNOSIS — H04.121: ICD-10-CM

## 2022-03-18 PROCEDURE — 92250 FUNDUS PHOTOGRAPHY W/I&R: CPT

## 2022-03-18 PROCEDURE — 92004 COMPRE OPH EXAM NEW PT 1/>: CPT

## 2022-03-18 ASSESSMENT — TONOMETRY
OS_IOP_MMHG: 12
OD_IOP_MMHG: 14

## 2022-03-18 ASSESSMENT — VISUAL ACUITY
OS_CC: 20/20
OD_CC: 20/20

## 2022-03-31 ENCOUNTER — ESTABLISHED COMPREHENSIVE EXAM (OUTPATIENT)
Dept: URBAN - METROPOLITAN AREA CLINIC 6 | Facility: CLINIC | Age: 67
End: 2022-03-31

## 2022-03-31 DIAGNOSIS — H52.03: ICD-10-CM

## 2022-03-31 DIAGNOSIS — Z01.00: ICD-10-CM

## 2022-03-31 PROCEDURE — 92012 INTRM OPH EXAM EST PATIENT: CPT

## 2022-03-31 PROCEDURE — 92015 DETERMINE REFRACTIVE STATE: CPT

## 2022-03-31 ASSESSMENT — TONOMETRY
OD_IOP_MMHG: 14
OS_IOP_MMHG: 13

## 2022-03-31 ASSESSMENT — VISUAL ACUITY
OS_CC: J1
OS_CC: 20/30-2
OD_CC: J1
OD_CC: 20/30+2

## 2022-05-03 ENCOUNTER — FOLLOW UP (OUTPATIENT)
Dept: URBAN - METROPOLITAN AREA CLINIC 6 | Facility: CLINIC | Age: 67
End: 2022-05-03

## 2022-05-03 DIAGNOSIS — H25.813: ICD-10-CM

## 2022-05-03 DIAGNOSIS — H43.391: ICD-10-CM

## 2022-05-03 PROCEDURE — 92014 COMPRE OPH EXAM EST PT 1/>: CPT

## 2022-05-03 PROCEDURE — 92250 FUNDUS PHOTOGRAPHY W/I&R: CPT

## 2022-05-03 ASSESSMENT — TONOMETRY
OS_IOP_MMHG: 12
OD_IOP_MMHG: 11

## 2022-05-03 ASSESSMENT — VISUAL ACUITY
OS_GLARE: 20/100
OD_CC: 20/30+2
OD_GLARE: 20/80-1
OS_CC: 20/25
OU_CC: J1

## 2022-05-17 ENCOUNTER — OFFICE VISIT (OUTPATIENT)
Dept: SURGICAL ONCOLOGY | Facility: CLINIC | Age: 67
End: 2022-05-17
Payer: COMMERCIAL

## 2022-05-17 VITALS
HEART RATE: 79 BPM | DIASTOLIC BLOOD PRESSURE: 70 MMHG | BODY MASS INDEX: 24.33 KG/M2 | OXYGEN SATURATION: 98 % | SYSTOLIC BLOOD PRESSURE: 122 MMHG | HEIGHT: 64 IN | WEIGHT: 142.5 LBS | RESPIRATION RATE: 12 BRPM

## 2022-05-17 DIAGNOSIS — Z08 ENCOUNTER FOR FOLLOW-UP EXAMINATION AFTER COMPLETED TREATMENT FOR MALIGNANT NEOPLASM: Primary | ICD-10-CM

## 2022-05-17 DIAGNOSIS — Z85.3 HISTORY OF LEFT BREAST CANCER: ICD-10-CM

## 2022-05-17 PROCEDURE — 99213 OFFICE O/P EST LOW 20 MIN: CPT

## 2022-05-17 NOTE — PROGRESS NOTES
Surgical Oncology Follow Up       [address]  [facility] [place]  [address] [facility] [address]    Felicia Loo  1955  [id number]  [address] [facility],[address] [facility]  CANCER CARE [facility] SURGICAL ONCOLOGY [place]  [address] [facility] [address]    Chief Complaint   Patient presents with    Follow-up     6 month        Assessment/Plan:  1  Encounter for follow-up examination after completed treatment for malignant neoplasm  - 1 year follow up    2  History of left breast cancer  - Mammo diagnostic bilateral w 3d & cad; Future      Discussion/Summary: Patient is a 71-year-old female presenting today for a six-month follow-up for left breast cancer diagnosed in May 2017  Pathology revealed invasive ductal carcinoma, %, SD 1-2%, HER2 negative  She underwent a left lumpectomy with Dr Abner Ruelas and completed whole breast radiation therapy  She is attempted taking aromatase inhibitors however stopped secondary to side effects  I have scheduled her diagnostic imaging for September of this year  There are no concerns on her clinical breast exam   I will plan to see her back in 1 year sooner should the need arise  She is instructed to call with any questions or concerns prior to that time  All questions were answered today  History of Present Illness:     Oncology History   History of left breast cancer   5/10/2017 Initial Diagnosis    Malignant neoplasm of upper-outer quadrant of left female breast ([facility] [address] )     5/10/2017 Biopsy    Left breast biopsy  Invasive ductal carcinoma, Grade 2   % positive, SD 1-2% positive, HER2 negative     5/30/2017 Surgery    Left lumpectomy  2 foci of Grade 2 invasive ductal carcinoma with DCIS present     5/30/2017 -  Cancer Staged    Stage IA pT1b(m) pN0     7/2017 -  Hormone Therapy    Anastrozole - Dr Timmy Kapadia stopped due to reflux in July, 2018  May restart       7/25/2017 - 9/5/2017 Radiation Treatments:  Course: C1    Plan ID Energy Fractions Dose per Fraction (cGy) Total Dose Delivered (cGy) Elapsed Days   BH L Breast 6X 25 / 25 200 5,000 34   BH L Brst e 12E 5 / 5 200 1,000 7                                                                                  Total dose 6000 cGy  Treatment Dates:  7/25/2017 - 9/5/2017        Hormone Therapy    Exemestane? ?          -Interval History:  Patient is a 18-year-old female presenting today for a six-month follow-up for left breast cancer diagnosed in May 2017  She is currently on observation  Patient denies changes on her breast exam  She denies persistent headaches, bone pain, back pain, shortness of breath, or abdominal pain  Review of Systems:  Review of Systems   Constitutional: Negative for activity change, appetite change, fatigue and unexpected weight change  Respiratory: Negative for cough and shortness of breath  Cardiovascular: Negative for chest pain  Gastrointestinal: Negative for abdominal pain, diarrhea, nausea and vomiting  Endocrine: Negative for heat intolerance  Musculoskeletal: Negative for arthralgias, back pain and myalgias  Skin: Negative for rash  Neurological: Negative for weakness and headaches  Hematological: Negative for adenopathy         Patient Active Problem List   Diagnosis    History of left breast cancer    Allergic rhinitis    Irritable bowel syndrome    Meniere's disease    Meniere's disease of right ear    Osteopenia    Osteoporosis    Seasonal allergies    Axillary lump, left    Encounter for follow-up examination after completed treatment for malignant neoplasm     Past Medical History:   Diagnosis Date    Acid reflux     Breast cancer ([facility] [address] ) 05/30/2017    History of external beam radiation therapy     History of radiation therapy 2017    30 TREATMENTS    Irritable bowel syndrome     Meniere's disease     Osteopenia of the elderly     PONV (postoperative nausea and vomiting)      Past Surgical History:   Procedure Laterality Date    APPENDECTOMY      BREAST BIOPSY Left 2017    ultrasound guided postive    BREAST EXCISIONAL BIOPSY Left     benign    BREAST LUMPECTOMY Left 2017    cancer    BREAST SURGERY Left     biopsy    CRYOABLATION      MAMMO NEEDLE LOCALIZATION LEFT (ALL INC) Left 2017    MAMMO NEEDLE LOCALIZATION LEFT (ALL INC) EACH ADD Left 2017    [place] BIOPSY/EXCISION, LYMPH NODE(S) Left 2017    Procedure: LYMPHOSCINTIGRAPHY; INTRAOPERATIVE LYMPHATIC MAPPING; SENTINEL LYMPH NODE BIOPSY (INJECT BY DR RIOS AT 2230); Surgeon: Kamran Kelly MD;  [facility];  Service: Surgical Oncology    [place] PERQ DEVICE PLACEMT BREAST LOC 1ST LES W GUIDNCE Left 2017    Procedure: BREAST TWO SITE LUMPECTOMY; BREAST TWO SITE NEEDLE LOCALIZATION (TWO SITE NEEDLE LOC AT 0700);   Surgeon: Kamran Kelly MD;  [facility];  Service: Surgical Oncology    TONSILLECTOMY      US GUIDANCE BREAST BIOPSY LEFT EACH ADDITIONAL Left 5/10/2017    US GUIDED BREAST BIOPSY LEFT COMPLETE Left 5/10/2017     Family History   Problem Relation Age of Onset    Lymphoma Mother     Tuberculosis Mother     Prostate cancer Father     Diabetes Father     Breast cancer Paternal Grandmother     Breast cancer Paternal Aunt      Social History     Socioeconomic History    Marital status: Single     Spouse name: Not on file    Number of children: 2    Years of education: Not on file    Highest education level: Not on file   Occupational History    Not on file   Tobacco Use    Smoking status: Former Smoker     Quit date:      Years since quittin 4    Smokeless tobacco: Never Used   Vaping Use    Vaping Use: Never used   Substance and Sexual Activity    Alcohol use: No    Drug use: No    Sexual activity: Yes   Other Topics Concern    Not on file   Social History Narrative    Not on file     Social Determinants of Health     Financial Resource Strain: Not on file   Food Insecurity: Not on file   Transportation Needs: Not on file   Physical Activity: Not on file   Stress: Not on file   Social Connections: Not on file   Intimate Partner Violence: Not on file   Housing Stability: Not on file       Current Outpatient Medications:     Artificial Tear Ointment (DRY EYES OP), Apply to eye, Disp: , Rfl:     CALCIUM CITRATE PO, Take 2,000 tablets by mouth 2 (two) times daily after meals  , Disp: , Rfl:     Cholecalciferol (VITAMIN D3) 5000 units TABS, Take by mouth, Disp: , Rfl:     fexofenadine (ALLEGRA) 60 MG tablet, Take 1 tablet by mouth as needed  , Disp: , Rfl:     Multiple Vitamin (MULTIVITAMIN) tablet, Take 2 tablets by mouth daily in the early morning  , Disp: , Rfl:     Probiotic Product (PROBIOTIC-10) CAPS, Take by mouth, Disp: , Rfl:     ibuprofen (MOTRIN) 600 mg tablet, Take 600 mg by mouth every 6 (six) hours as needed (Patient not taking: Reported on 11/10/2021 ), Disp: , Rfl:     triamcinolone (KENALOG) 0 1 % ointment, Apply 1 application topically 2 (two) times a day (Patient not taking: Reported on 11/10/2021 ), Disp: , Rfl:     triamterene-hydrochlorothiazide (MAXZIDE-25) 37 5-25 mg per tablet, Triamterene-HCTZ 37 5-25 MG Oral Tablet  Quantity: 100 00; Refills: 0   Started 5-Jan-2012 Active (Patient not taking: No sig reported), Disp: , Rfl:   Allergies   Allergen Reactions    Dust Mite Extract Other (See Comments)     sneezing    Molds & Smuts Allergic Rhinitis    Shellfish Allergy - Food Allergy GI Intolerance    Shellfish-Derived Products - Food Allergy GI Intolerance and Vomiting     Vitals:    05/17/22 1514   BP: 122/70   Pulse: 79   Resp: 12   SpO2: 98%       Physical Exam  Constitutional:       General: She is not in acute distress  Appearance: Normal appearance  Cardiovascular:      Rate and Rhythm: Normal rate and regular rhythm  Pulses: Normal pulses  Heart sounds: Normal heart sounds     Pulmonary:      Effort: Pulmonary effort is normal       Breath sounds: Normal breath sounds  Chest:      Chest wall: No mass  Breasts:      Right: No swelling, bleeding, inverted nipple, mass, nipple discharge, skin change, tenderness, axillary adenopathy or supraclavicular adenopathy  Left: No swelling, bleeding, inverted nipple, mass, nipple discharge, skin change, tenderness, axillary adenopathy or supraclavicular adenopathy  Comments: Left breast lumpectomy and SLN biopsy scar  No masses, nodularity, skin changes, nipple changes or discharge, or adenopathy appreciated on physical exam      Abdominal:      General: Abdomen is flat  Palpations: Abdomen is soft  Lymphadenopathy:      Upper Body:      Right upper body: No supraclavicular, axillary or pectoral adenopathy  Left upper body: No supraclavicular, axillary or pectoral adenopathy  Skin:     General: Skin is warm  Neurological:      General: No focal deficit present  Mental Status: She is alert and oriented to person, place, and time  Psychiatric:         Mood and Affect: Mood normal          Behavior: Behavior normal            Results:    Imaging  No results found  I reviewed the above imaging data  Advance Care Planning/Advance Directives:  Discussed disease status, cancer treatment plans and/or cancer treatment goals with the patient

## 2022-09-27 ENCOUNTER — HOSPITAL ENCOUNTER (OUTPATIENT)
Dept: MAMMOGRAPHY | Facility: CLINIC | Age: 67
Discharge: HOME | End: 2022-09-27
Payer: COMMERCIAL

## 2022-09-27 VITALS — HEIGHT: 64 IN | BODY MASS INDEX: 24.09 KG/M2 | WEIGHT: 141.09 LBS

## 2022-09-27 DIAGNOSIS — Z85.3 HISTORY OF LEFT BREAST CANCER: ICD-10-CM

## 2022-09-27 PROCEDURE — G0279 TOMOSYNTHESIS, MAMMO: HCPCS

## 2022-09-27 PROCEDURE — 77066 DX MAMMO INCL CAD BI: CPT

## 2023-05-09 ENCOUNTER — ESTABLISHED COMPREHENSIVE EXAM (OUTPATIENT)
Dept: URBAN - METROPOLITAN AREA CLINIC 6 | Facility: CLINIC | Age: 68
End: 2023-05-09

## 2023-05-09 DIAGNOSIS — H43.391: ICD-10-CM

## 2023-05-09 DIAGNOSIS — H04.121: ICD-10-CM

## 2023-05-09 DIAGNOSIS — H25.813: ICD-10-CM

## 2023-05-09 PROCEDURE — 92014 COMPRE OPH EXAM EST PT 1/>: CPT

## 2023-05-09 ASSESSMENT — VISUAL ACUITY
OD_GLARE: 20/60
OU_CC: J1
OS_GLARE: 20/60
OD_CC: 20/30
OS_CC: 20/20

## 2023-05-09 ASSESSMENT — TONOMETRY
OD_IOP_MMHG: 14
OS_IOP_MMHG: 13

## 2023-05-23 ENCOUNTER — OFFICE VISIT (OUTPATIENT)
Dept: SURGICAL ONCOLOGY | Facility: CLINIC | Age: 68
End: 2023-05-23

## 2023-05-23 VITALS
DIASTOLIC BLOOD PRESSURE: 76 MMHG | SYSTOLIC BLOOD PRESSURE: 118 MMHG | OXYGEN SATURATION: 99 % | TEMPERATURE: 97.2 F | WEIGHT: 143 LBS | HEIGHT: 64 IN | BODY MASS INDEX: 24.41 KG/M2 | HEART RATE: 84 BPM | RESPIRATION RATE: 21 BRPM

## 2023-05-23 DIAGNOSIS — Z85.3 HISTORY OF LEFT BREAST CANCER: ICD-10-CM

## 2023-05-23 DIAGNOSIS — Z12.31 VISIT FOR SCREENING MAMMOGRAM: ICD-10-CM

## 2023-05-23 DIAGNOSIS — Z08 ENCOUNTER FOR FOLLOW-UP EXAMINATION AFTER COMPLETED TREATMENT FOR MALIGNANT NEOPLASM: Primary | ICD-10-CM

## 2023-05-23 NOTE — PROGRESS NOTES
Surgical Oncology Follow Up       [address]  [facility] SURGICAL ONCOLOGY [place]  [address]  [place] [address]    [facility]  1955  [id number]  [address] [place]  CANC[facility] SURGICAL ONCOLOGY [place]  [address] [facility] [address]    Chief Complaint   Patient presents with   • office visit       Assessment/Plan:  1  Encounter for follow-up examination after completed treatment for malignant neoplasm  - 1 year follow up    2  History of left breast cancer    3  Visit for screening mammogram  - Mammo screening bilateral w 3d & cad; Future       Discussion/Summary: Patient is a 78-year-old female presenting today for a 1 year follow-up for left breast cancer diagnosed in May 2017  Pathology revealed invasive ductal carcinoma, %, LA 1-2%, HER2 negative  She underwent a left lumpectomy with Dr Yaima Do and completed whole breast radiation therapy  She is attempted taking aromatase inhibitors however stopped secondary to side effects  She had a bilateral diagnostic mammogram on 9/27/2022 which was BI-RADS 1 category 1 density  There were no concerns on her cbe  I will see the patient back in 1 year or sooner should the need arise  She was instructed to call with any questions or concerns prior to this time  All questions were answered today  History of Present Illness:     Oncology History   History of left breast cancer   5/10/2017 Initial Diagnosis    Malignant neoplasm of upper-outer quadrant of left female breast ([address] )     5/10/2017 Biopsy    Left breast biopsy  Invasive ductal carcinoma, Grade 2   % positive, LA 1-2% positive, HER2 negative     5/30/2017 Surgery    Left lumpectomy  2 foci of Grade 2 invasive ductal carcinoma with DCIS present     5/30/2017 -  Cancer Staged    Stage IA pT1b(m) pN0     7/2017 -  Hormone Therapy    Anastrozole - Dr Milo Jenkins stopped due to reflux in July, 2018  May restart  7/25/2017 - 9/5/2017 Radiation    Treatments:  Course: C1    Plan ID Energy Fractions Dose per Fraction (cGy) Total Dose Delivered (cGy) Elapsed Days   BH L Breast 6X 25 / 25 200 5,000 34   BH L Brst e 12E 5 / 5 200 1,000 7                                                                                  Total dose 6000 cGy  Treatment Dates:  7/25/2017 - 9/5/2017        Hormone Therapy    Exemestane? ?          -Interval History: Patient is a 23-year-old female presenting today for a 1 year follow-up for left breast cancer diagnosed in May 2017  She has been on observation  She had a cochlear implant placed last year and is hearing much better  She had a bilateral diagnostic mammogram on 9/27/2022 which was BI-RADS 1 category 1 density  Patient denies changes on her breast exam  She denies persistent headaches, bone pain, back pain, shortness of breath, or abdominal pain  Review of Systems:  Review of Systems   Constitutional: Negative for activity change, appetite change, fatigue and unexpected weight change  Respiratory: Negative for cough and shortness of breath  Cardiovascular: Negative for chest pain  Gastrointestinal: Negative for abdominal pain, diarrhea, nausea and vomiting  Endocrine: Negative for heat intolerance  Musculoskeletal: Negative for arthralgias, back pain and myalgias  Skin: Negative for rash  Neurological: Negative for weakness and headaches  Hematological: Negative for adenopathy         Patient Active Problem List   Diagnosis   • History of left breast cancer   • Allergic rhinitis   • Irritable bowel syndrome   • Meniere's disease   • Meniere's disease of right ear   • Osteopenia   • Osteoporosis   • Seasonal allergies   • Axillary lump, left   • Encounter for follow-up examination after completed treatment for malignant neoplasm     Past Medical History:   Diagnosis Date   • Acid reflux    • Breast cancer ([address] [facility] [address] ) 05/30/2017   • History of external beam radiation therapy    • History of radiation therapy     30 TREATMENTS   • Irritable bowel syndrome    • Meniere's disease    • Osteopenia of the elderly    • PONV (postoperative nausea and vomiting)      Past Surgical History:   Procedure Laterality Date   • APPENDECTOMY     • BREAST BIOPSY Left 05/10/2017    ultrasound guided- inv shahla ca   • BREAST EXCISIONAL BIOPSY Left     benign   • BREAST LUMPECTOMY Left 2017    cancer   • BREAST SURGERY Left     biopsy   • CRYOABLATION     • MAMMO NEEDLE LOCALIZATION LEFT (ALL INC) Left 2017   • MAMMO NEEDLE LOCALIZATION LEFT (ALL INC) EACH ADD Left 2017   • SC BIOPSY/EXCISION, LYMPH NODE(S) Left 2017    Procedure: LYMPHOSCINTIGRAPHY; INTRAOPERATIVE LYMPHATIC MAPPING; SENTINEL LYMPH NODE BIOPSY (INJECT BY DR RIOS AT 8535); Surgeon: Brandie Jolley MD;  [facility];  Service: Surgical Oncology   • SC PERQ DEVICE PLACEMT BREAST LOC 1ST LES W GUIDNCE Left 2017    Procedure: BREAST TWO SITE LUMPECTOMY; BREAST TWO SITE NEEDLE LOCALIZATION (TWO SITE NEEDLE LOC AT 0700);   Surgeon: Brandie Jolley MD;  [facility];  Service: Surgical Oncology   • TONSILLECTOMY     • US GUIDANCE BREAST BIOPSY LEFT EACH ADDITIONAL Left 05/10/2017   • US GUIDED BREAST BIOPSY LEFT COMPLETE Left 05/10/2017     Family History   Problem Relation Age of Onset   • Lymphoma Mother    • Tuberculosis Mother    • Prostate cancer Father    • Diabetes Father    • Breast cancer Paternal Grandmother    • Breast cancer Paternal Aunt      Social History     Socioeconomic History   • Marital status: Single     Spouse name: Not on file   • Number of children: 2   • Years of education: Not on file   • Highest education level: Not on file   Occupational History   • Not on file   Tobacco Use   • Smoking status: Former     Types: Cigarettes     Quit date: 65     Years since quittin 4   • Smokeless tobacco: Never   Vaping Use   • Vaping Use: Never used   Substance and Sexual Activity   • Alcohol use: No   • Drug use: No   • Sexual activity: Yes   Other Topics Concern   • Not on file   Social History Narrative   • Not on file     Social Determinants of Health     Financial Resource Strain: Not on file   Food Insecurity: Not on file   Transportation Needs: Not on file   Physical Activity: Not on file   Stress: Not on file   Social Connections: Not on file   Intimate Partner Violence: Not on file   Housing Stability: Not on file       Current Outpatient Medications:   •  Artificial Tear Ointment (DRY EYES OP), Apply to eye, Disp: , Rfl:   •  CALCIUM CITRATE PO, Take 2,000 tablets by mouth 2 (two) times daily after meals  , Disp: , Rfl:   •  Cholecalciferol (VITAMIN D3) 5000 units TABS, Take by mouth, Disp: , Rfl:   •  fexofenadine (ALLEGRA) 60 MG tablet, Take 1 tablet by mouth as needed  , Disp: , Rfl:   •  ibuprofen (MOTRIN) 600 mg tablet, Take 600 mg by mouth every 6 (six) hours as needed, Disp: , Rfl:   •  Multiple Vitamin (MULTIVITAMIN) tablet, Take 2 tablets by mouth daily in the early morning  , Disp: , Rfl:   •  Probiotic Product (PROBIOTIC-10) CAPS, Take by mouth, Disp: , Rfl:   •  triamcinolone (KENALOG) 0 1 % ointment, Apply 1 application topically 2 (two) times a day (Patient not taking: Reported on 11/10/2021 ), Disp: , Rfl:   •  triamterene-hydrochlorothiazide (MAXZIDE-25) 37 5-25 mg per tablet, Triamterene-HCTZ 37 5-25 MG Oral Tablet  Quantity: 100 00;   Refills: 0   Started 5-Jan-2012 Active (Patient not taking: No sig reported), Disp: , Rfl:   Allergies   Allergen Reactions   • Prolia [Denosumab] GI Intolerance   • Dust Mite Extract Other (See Comments)     sneezing   • Molds & Smuts Allergic Rhinitis   • Shellfish Allergy - Food Allergy GI Intolerance   • Shellfish-Derived Products - Food Allergy GI Intolerance and Vomiting     Vitals:    05/23/23 1517   BP: 118/76   Pulse: 84   Resp: 21   Temp: (!) 97 2 °F (36 2 °C)   SpO2: 99%       Physical Exam  Constitutional:       General: She is not in acute distress  Appearance: Normal appearance  Cardiovascular:      Rate and Rhythm: Normal rate and regular rhythm  Pulses: Normal pulses  Heart sounds: Normal heart sounds  Pulmonary:      Effort: Pulmonary effort is normal       Breath sounds: Normal breath sounds  Chest:      Chest wall: No mass  Breasts:     Right: No swelling, bleeding, inverted nipple, mass, nipple discharge, skin change or tenderness  Left: No swelling, bleeding, inverted nipple, mass, nipple discharge, skin change or tenderness  Abdominal:      General: Abdomen is flat  Palpations: Abdomen is soft  Lymphadenopathy:      Upper Body:      Right upper body: No supraclavicular, axillary or pectoral adenopathy  Left upper body: No supraclavicular, axillary or pectoral adenopathy  Skin:     General: Skin is warm  Neurological:      General: No focal deficit present  Mental Status: She is alert and oriented to person, place, and time  Psychiatric:         Mood and Affect: Mood normal          Behavior: Behavior normal            Results:    Imaging  No results found  I reviewed the above imaging data  Advance Care Planning/Advance Directives:  Discussed disease status, cancer treatment plans and/or cancer treatment goals with the patient

## 2023-09-28 ENCOUNTER — HOSPITAL ENCOUNTER (OUTPATIENT)
Dept: RADIOLOGY | Age: 68
Discharge: HOME | End: 2023-09-28
Payer: COMMERCIAL

## 2023-09-28 VITALS — WEIGHT: 140 LBS | BODY MASS INDEX: 23.32 KG/M2 | HEIGHT: 65 IN

## 2023-09-28 DIAGNOSIS — Z12.31 VISIT FOR SCREENING MAMMOGRAM: ICD-10-CM

## 2023-09-28 PROCEDURE — 77063 BREAST TOMOSYNTHESIS BI: CPT

## 2023-09-28 PROCEDURE — 77067 SCR MAMMO BI INCL CAD: CPT

## 2024-05-29 ENCOUNTER — OFFICE VISIT (OUTPATIENT)
Dept: SURGICAL ONCOLOGY | Facility: CLINIC | Age: 69
End: 2024-05-29
Payer: COMMERCIAL

## 2024-05-29 VITALS
WEIGHT: 145 LBS | TEMPERATURE: 97.7 F | RESPIRATION RATE: 18 BRPM | OXYGEN SATURATION: 98 % | BODY MASS INDEX: 24.16 KG/M2 | HEART RATE: 69 BPM | DIASTOLIC BLOOD PRESSURE: 86 MMHG | HEIGHT: 65 IN | SYSTOLIC BLOOD PRESSURE: 128 MMHG

## 2024-05-29 DIAGNOSIS — Z08 ENCOUNTER FOR FOLLOW-UP EXAMINATION AFTER COMPLETED TREATMENT FOR MALIGNANT NEOPLASM: Primary | ICD-10-CM

## 2024-05-29 DIAGNOSIS — Z85.3 HISTORY OF LEFT BREAST CANCER: ICD-10-CM

## 2024-05-29 PROCEDURE — 99213 OFFICE O/P EST LOW 20 MIN: CPT

## 2024-05-29 NOTE — PROGRESS NOTES
Surgical Oncology Follow Up       [address] [facility] SURGICAL ONCOLOGY [place]  [address]  [facility] [address]    Isidra Cruz  1955  [id number]  [address] [facility] SURGICAL ONCOLOGY [place]  [address] [facility] [address] [facility]  [facility] [address]    Chief Complaint   Patient presents with   • office visit         Assessment/Plan:  1. Encounter for follow-up examination after completed treatment for malignant neoplasm  - 1 year f/u    2. History of left breast cancer  - mammo scheduled in September    Discussion/Summary: Patient is a 68-year-old female presenting today for a 1 year follow-up for left breast cancer diagnosed in May 2017. Pathology revealed invasive ductal carcinoma, %, MO 1-2%, HER2 negative. She underwent a left lumpectomy with Dr. Miles and completed whole breast radiation therapy. She attempted taking aromatase inhibitors however stopped secondary to side effects. She had a bilateral diagnostic mammogram on 9/28/2023 which was BI-RADS 2 category 1 density. Her mammo is scheduled for this year. There were no concerns on her cbe. I will see the patient back in 1 year or sooner should the need arise. She was instructed to call with any questions or concerns prior to this time. All questions were answered today.     History of Present Illness:     Oncology History   History of left breast cancer   5/10/2017 Initial Diagnosis    Malignant neoplasm of upper-outer quadrant of left female breast ([facility])     5/10/2017 Biopsy    Left breast biopsy  Invasive ductal carcinoma, Grade 2 .  % positive, MO 1-2% positive, HER2 negative     5/30/2017 Surgery    Left lumpectomy  2 foci of Grade 2 invasive ductal carcinoma with DCIS present     5/30/2017 -  Cancer Staged    Stage IA pT1b(m) pN0     7/2017 -  Hormone Therapy    Anastrozole - Dr Bowen stopped due to reflux in July, 2018. May restart.     7/25/2017 - 9/5/2017 Radiation     Treatments:  Course: C1    Plan ID Energy Fractions Dose per Fraction (cGy) Total Dose Delivered (cGy) Elapsed Days   BH L Breast 6X 25 / 25 200 5,000 34   BH L Brst e 12E 5 / 5 200 1,000 7                                                                                  Total dose 6000 cGy  Treatment Dates:  7/25/2017 - 9/5/2017        Hormone Therapy    Exemestane??          -Interval History: Patient is a 68-year-old female presenting today for a 1 year follow-up for left breast cancer diagnosed in May 2017. She is on obs. She had a bilateral diagnostic mammogram on 9/28/2023 which was BI-RADS 2 category 1 density. Patient denies changes on her breast exam. She denies persistent headaches, bone pain, back pain, shortness of breath, or abdominal pain.      Review of Systems:  Review of Systems   Constitutional:  Negative for activity change, appetite change, fatigue and unexpected weight change.   Respiratory:  Negative for cough and shortness of breath.    Cardiovascular:  Negative for chest pain.   Gastrointestinal:  Negative for abdominal pain, diarrhea, nausea and vomiting.   Endocrine: Negative for heat intolerance.   Musculoskeletal:  Negative for arthralgias, back pain and myalgias.   Skin:  Negative for rash.   Neurological:  Negative for weakness and headaches.   Hematological:  Negative for adenopathy.       Patient Active Problem List   Diagnosis   • History of left breast cancer   • Allergic rhinitis   • Irritable bowel syndrome   • Meniere's disease   • Meniere's disease of right ear   • Osteopenia   • Osteoporosis   • Seasonal allergies   • Axillary lump, left   • Encounter for follow-up examination after completed treatment for malignant neoplasm     Past Medical History:   Diagnosis Date   • Acid reflux    • Breast cancer (HCC) 05/30/2017   • History of external beam radiation therapy    • History of radiation therapy 2017    30 TREATMENTS   • Irritable bowel syndrome    • Meniere's disease    •  Osteopenia of the elderly    • PONV (postoperative nausea and vomiting)      Past Surgical History:   Procedure Laterality Date   • APPENDECTOMY     • BREAST BIOPSY Left 05/10/2017    ultrasound guided- inv shahla ca   • BREAST EXCISIONAL BIOPSY Left     benign   • BREAST LUMPECTOMY Left 2017    cancer   • BREAST SURGERY Left     biopsy   • CRYOABLATION     • MAMMO NEEDLE LOCALIZATION LEFT (ALL INC) Left 2017   • MAMMO NEEDLE LOCALIZATION LEFT (ALL INC) EACH ADD Left 2017   • IL BX/EXC LYMPH NODE OPEN SUPERFICIAL Left 2017    Procedure: LYMPHOSCINTIGRAPHY; INTRAOPERATIVE LYMPHATIC MAPPING; SENTINEL LYMPH NODE BIOPSY (INJECT BY DR MILES AT 0645);  Surgeon: Manolo Miles MD;  [facility];  Service: Surgical Oncology   • IL PERQ DEVICE PLACEMENT BREAST LOC 1ST LES W/GDNCE Left 2017    Procedure: BREAST TWO SITE LUMPECTOMY; BREAST TWO SITE NEEDLE LOCALIZATION (TWO SITE NEEDLE LOC AT 0700);  Surgeon: Manolo Miles MD;  [facility];  Service: Surgical Oncology   • TONSILLECTOMY     • US GUIDANCE BREAST BIOPSY LEFT EACH ADDITIONAL Left 05/10/2017   • US GUIDED BREAST BIOPSY LEFT COMPLETE Left 05/10/2017     Family History   Problem Relation Age of Onset   • Lymphoma Mother    • Tuberculosis Mother    • Prostate cancer Father    • Diabetes Father    • No Known Problems Sister    • No Known Problems Daughter    • Breast cancer Paternal Grandmother    • Breast cancer Paternal Aunt      Social History     Socioeconomic History   • Marital status: Single     Spouse name: Not on file   • Number of children: 2   • Years of education: Not on file   • Highest education level: Not on file   Occupational History   • Not on file   Tobacco Use   • Smoking status: Former     Current packs/day: 0.00     Types: Cigarettes     Quit date:      Years since quittin.4   • Smokeless tobacco: Never   Vaping Use   • Vaping status: Never Used   Substance and Sexual Activity   • Alcohol use: No    • Drug use: No   • Sexual activity: Yes   Other Topics Concern   • Not on file   Social History Narrative   • Not on file     Social Determinants of Health     Financial Resource Strain: Low Risk  (10/18/2023)    Received from [facility]    Overall Financial Resource Strain (CARDIA)    • Difficulty of Paying Living Expenses: Not hard at all   Food Insecurity: No Food Insecurity (10/18/2023)    Received from [facility]    Hunger Vital Sign    • Worried About Running Out of Food in the Last Year: Never true    • Ran Out of Food in the Last Year: Never true   Transportation Needs: No Transportation Needs (10/18/2023)    Received from [facility]    PRAPARE - Transportation    • Lack of Transportation (Medical): No    • Lack of Transportation (Non-Medical): No   Physical Activity: Not on file   Stress: No Stress Concern Present (10/18/2023)    Received from [facility]    [place] [employer] - Occupational Stress Questionnaire    • Feeling of Stress : Not at all   Social Connections: Socially Isolated (10/18/2023)    Received from [facility]    Social Connection and Isolation Panel [NHANES]    • Frequency of Communication with Friends and Family: Twice a week    • Frequency of Social Gatherings with Friends and Family: Once a week    • Attends Judaism Services: Never    • Active Member of Clubs or Organizations: No    • Attends Club or Organization Meetings: Never    • Marital Status:    Intimate Partner Violence: Not At Risk (10/18/2023)    Received from [facility]    Humiliation, Afraid, Rape, and Kick questionnaire    • Fear of Current or Ex-Partner: No    • Emotionally Abused: No    • Physically Abused: No    • Sexually Abused: No   Housing Stability: Not on file       Current Outpatient Medications:   •  Artificial Tear Ointment (DRY EYES OP), Apply to eye, Disp: , Rfl:   •   CALCIUM CITRATE PO, Take 2,000 tablets by mouth 2 (two) times daily after meals  , Disp: , Rfl:   •  Cholecalciferol (VITAMIN D3) 5000 units TABS, Take by mouth, Disp: , Rfl:   •  fexofenadine (ALLEGRA) 60 MG tablet, Take 1 tablet by mouth as needed  , Disp: , Rfl:   •  Multiple Vitamin (MULTIVITAMIN) tablet, Take 2 tablets by mouth daily in the early morning  , Disp: , Rfl:   •  Probiotic Product (PROBIOTIC-10) CAPS, Take by mouth, Disp: , Rfl:   •  ibuprofen (MOTRIN) 600 mg tablet, Take 600 mg by mouth every 6 (six) hours as needed, Disp: , Rfl:   •  triamcinolone (KENALOG) 0.1 % ointment, Apply 1 application topically 2 (two) times a day (Patient not taking: Reported on 11/10/2021 ), Disp: , Rfl:   •  triamterene-hydrochlorothiazide (MAXZIDE-25) 37.5-25 mg per tablet, Triamterene-HCTZ 37.5-25 MG Oral Tablet  Quantity: 100.00;  Refills: 0   Started 5-Jan-2012 Active (Patient not taking: No sig reported), Disp: , Rfl:   Allergies   Allergen Reactions   • Prolia [Denosumab] GI Intolerance   • Dust Mite Extract Other (See Comments)     sneezing   • Molds & Smuts Allergic Rhinitis   • Shellfish Allergy - Food Allergy GI Intolerance   • Shellfish-Derived Products - Food Allergy GI Intolerance and Vomiting     Vitals:    05/29/24 1458   BP: 128/86   Pulse: 69   Resp: 18   Temp: 97.7 °F (36.5 °C)   SpO2: 98%       Physical Exam  Constitutional:       General: She is not in acute distress.     Appearance: Normal appearance.   Cardiovascular:      Rate and Rhythm: Normal rate and regular rhythm.      Pulses: Normal pulses.      Heart sounds: Normal heart sounds.   Pulmonary:      Effort: Pulmonary effort is normal.      Breath sounds: Normal breath sounds.   Chest:      Chest wall: No mass.   Breasts:     Right: No swelling, bleeding, inverted nipple, mass, nipple discharge, skin change or tenderness.      Left: No swelling, bleeding, inverted nipple, mass, nipple discharge, skin change or tenderness.   Abdominal:       General: Abdomen is flat.      Palpations: Abdomen is soft.   Lymphadenopathy:      Upper Body:      Right upper body: No supraclavicular, axillary or pectoral adenopathy.      Left upper body: No supraclavicular, axillary or pectoral adenopathy.   Skin:     General: Skin is warm.   Neurological:      General: No focal deficit present.      Mental Status: She is alert and oriented to person, place, and time.   Psychiatric:         Mood and Affect: Mood normal.         Behavior: Behavior normal.           Results:    Imaging  No results found.    I reviewed the above imaging data.      Advance Care Planning/Advance Directives:  Discussed disease status, cancer treatment plans and/or cancer treatment goals with the patient.

## 2024-06-11 ENCOUNTER — PRE-OP CATARACT MEASUREMENTS (OUTPATIENT)
Dept: URBAN - METROPOLITAN AREA CLINIC 6 | Facility: CLINIC | Age: 69
End: 2024-06-11

## 2024-06-11 DIAGNOSIS — H04.123: ICD-10-CM

## 2024-06-11 DIAGNOSIS — H43.391: ICD-10-CM

## 2024-06-11 DIAGNOSIS — H25.813: ICD-10-CM

## 2024-06-11 PROCEDURE — 92136 OPHTHALMIC BIOMETRY: CPT | Mod: 26,LT

## 2024-06-11 PROCEDURE — 92014 COMPRE OPH EXAM EST PT 1/>: CPT

## 2024-06-11 ASSESSMENT — VISUAL ACUITY
OS_GLARE: 20/150
OS_CC: 20/30+2
OD_GLARE: 20/200+2
OD_PH: 20/25+2
OD_CC: 20/40
OU_CC: J2

## 2024-06-11 ASSESSMENT — TONOMETRY
OS_IOP_MMHG: 12
OD_IOP_MMHG: 12

## 2024-06-11 ASSESSMENT — KERATOMETRY
OS_AXISANGLE2_DEGREES: 14
OD_AXISANGLE_DEGREES: 119
OS_AXISANGLE_DEGREES: 104
OD_AXISANGLE2_DEGREES: 29
OD_K2POWER_DIOPTERS: 45.00
OS_K1POWER_DIOPTERS: 44.75
OD_K1POWER_DIOPTERS: 44.25
OS_K2POWER_DIOPTERS: 45.00

## 2024-07-01 ENCOUNTER — SURGERY/PROCEDURE (OUTPATIENT)
Dept: URBAN - METROPOLITAN AREA SURGICAL CENTER 6 | Facility: SURGICAL CENTER | Age: 69
End: 2024-07-01

## 2024-07-01 DIAGNOSIS — H25.811: ICD-10-CM

## 2024-07-01 PROCEDURE — 66984 XCAPSL CTRC RMVL W/O ECP: CPT

## 2024-07-01 PROCEDURE — MISCLAL LIGHT ADJUSTABLE LENS

## 2024-07-01 ASSESSMENT — KERATOMETRY
OS_K2POWER_DIOPTERS: 45.00
OD_AXISANGLE2_DEGREES: 29
OS_AXISANGLE_DEGREES: 104
OD_K2POWER_DIOPTERS: 45.00
OS_AXISANGLE2_DEGREES: 14
OD_K1POWER_DIOPTERS: 44.25
OS_K1POWER_DIOPTERS: 44.75
OD_AXISANGLE_DEGREES: 119

## 2024-07-02 ENCOUNTER — 1 DAY POST-OP (OUTPATIENT)
Dept: URBAN - METROPOLITAN AREA CLINIC 6 | Facility: CLINIC | Age: 69
End: 2024-07-02

## 2024-07-02 DIAGNOSIS — H25.812: ICD-10-CM

## 2024-07-02 DIAGNOSIS — Z96.1: ICD-10-CM

## 2024-07-02 PROCEDURE — 99024 POSTOP FOLLOW-UP VISIT: CPT

## 2024-07-02 PROCEDURE — 92136 OPHTHALMIC BIOMETRY: CPT | Mod: 26,LT

## 2024-07-02 ASSESSMENT — TONOMETRY
OS_IOP_MMHG: 14
OD_IOP_MMHG: 14

## 2024-07-02 ASSESSMENT — VISUAL ACUITY
OS_CC: 20/30
OD_SC: 20/20-2

## 2024-07-02 ASSESSMENT — KERATOMETRY
OS_AXISANGLE_DEGREES: 104
OS_K1POWER_DIOPTERS: 44.75
OS_K2POWER_DIOPTERS: 45.00
OD_K1POWER_DIOPTERS: 44.25
OS_AXISANGLE2_DEGREES: 14
OD_AXISANGLE_DEGREES: 119
OD_K2POWER_DIOPTERS: 45.00
OD_AXISANGLE2_DEGREES: 29

## 2024-07-08 ENCOUNTER — SURGERY/PROCEDURE (OUTPATIENT)
Dept: URBAN - METROPOLITAN AREA SURGICAL CENTER 6 | Facility: SURGICAL CENTER | Age: 69
End: 2024-07-08

## 2024-07-08 DIAGNOSIS — H25.812: ICD-10-CM

## 2024-07-08 PROCEDURE — MISCLAL LIGHT ADJUSTABLE LENS

## 2024-07-08 PROCEDURE — 66984 XCAPSL CTRC RMVL W/O ECP: CPT | Mod: 79,LT

## 2024-07-08 ASSESSMENT — KERATOMETRY
OS_AXISANGLE_DEGREES: 104
OS_K2POWER_DIOPTERS: 45.00
OD_K1POWER_DIOPTERS: 44.25
OD_AXISANGLE_DEGREES: 119
OS_AXISANGLE2_DEGREES: 14
OS_K1POWER_DIOPTERS: 44.75
OD_AXISANGLE2_DEGREES: 29
OD_K2POWER_DIOPTERS: 45.00

## 2024-07-09 ENCOUNTER — 1 DAY POST-OP (OUTPATIENT)
Dept: URBAN - METROPOLITAN AREA CLINIC 6 | Facility: CLINIC | Age: 69
End: 2024-07-09

## 2024-07-09 DIAGNOSIS — Z96.1: ICD-10-CM

## 2024-07-09 PROCEDURE — 99024 POSTOP FOLLOW-UP VISIT: CPT

## 2024-07-09 ASSESSMENT — TONOMETRY
OD_IOP_MMHG: 12
OS_IOP_MMHG: 18

## 2024-07-09 ASSESSMENT — KERATOMETRY
OS_AXISANGLE2_DEGREES: 14
OD_K2POWER_DIOPTERS: 45.00
OS_AXISANGLE_DEGREES: 104
OD_AXISANGLE2_DEGREES: 29
OD_K1POWER_DIOPTERS: 44.25
OD_AXISANGLE_DEGREES: 119
OS_K1POWER_DIOPTERS: 44.75
OS_K2POWER_DIOPTERS: 45.00

## 2024-07-09 ASSESSMENT — VISUAL ACUITY
OD_SC: 20/30
OS_SC: 20/30

## 2024-07-18 ENCOUNTER — 1 WEEK POST-OP (OUTPATIENT)
Dept: URBAN - METROPOLITAN AREA CLINIC 6 | Facility: CLINIC | Age: 69
End: 2024-07-18

## 2024-07-18 DIAGNOSIS — Z96.1: ICD-10-CM

## 2024-07-18 PROCEDURE — 99024 POSTOP FOLLOW-UP VISIT: CPT

## 2024-07-18 ASSESSMENT — VISUAL ACUITY
OS_SC: 20/25-1
OD_SC: 20/25-2
OU_SC: J7

## 2024-07-18 ASSESSMENT — KERATOMETRY
OS_AXISANGLE_DEGREES: 104
OS_K1POWER_DIOPTERS: 44.75
OS_K2POWER_DIOPTERS: 45.00
OS_AXISANGLE2_DEGREES: 14
OD_K1POWER_DIOPTERS: 44.25
OD_K2POWER_DIOPTERS: 45.00
OD_AXISANGLE2_DEGREES: 29
OD_AXISANGLE_DEGREES: 119

## 2024-07-18 ASSESSMENT — TONOMETRY
OS_IOP_MMHG: 13
OD_IOP_MMHG: 9

## 2024-08-06 ENCOUNTER — POST-OP CHECK (OUTPATIENT)
Dept: URBAN - METROPOLITAN AREA CLINIC 6 | Facility: CLINIC | Age: 69
End: 2024-08-06

## 2024-08-06 DIAGNOSIS — Z96.1: ICD-10-CM

## 2024-08-06 PROCEDURE — 99024 POSTOP FOLLOW-UP VISIT: CPT

## 2024-08-06 ASSESSMENT — KERATOMETRY
OS_AXISANGLE_DEGREES: 104
OD_K1POWER_DIOPTERS: 44.25
OD_AXISANGLE2_DEGREES: 29
OS_AXISANGLE2_DEGREES: 14
OD_AXISANGLE_DEGREES: 119
OS_K1POWER_DIOPTERS: 44.75
OS_K2POWER_DIOPTERS: 45.00
OD_K2POWER_DIOPTERS: 45.00

## 2024-08-06 ASSESSMENT — VISUAL ACUITY
OD_SC: 20/25
OD_SC: J10
OS_SC: 20/30+2
OU_SC: J5
OU_SC: 20/20-2
OS_SC: J7

## 2024-08-06 ASSESSMENT — TONOMETRY
OD_IOP_MMHG: 9
OS_IOP_MMHG: 9

## 2024-08-13 ENCOUNTER — POST-OP CHECK (OUTPATIENT)
Dept: URBAN - METROPOLITAN AREA CLINIC 6 | Facility: CLINIC | Age: 69
End: 2024-08-13

## 2024-08-13 DIAGNOSIS — Z96.1: ICD-10-CM

## 2024-08-13 PROCEDURE — 99024 POSTOP FOLLOW-UP VISIT: CPT | Mod: LD

## 2024-08-13 ASSESSMENT — VISUAL ACUITY
OD_SC: 20/20
OS_SC: J5
OS_SC: 20/40-2

## 2024-08-13 ASSESSMENT — TONOMETRY
OS_IOP_MMHG: 8
OD_IOP_MMHG: 10

## 2024-08-13 ASSESSMENT — KERATOMETRY
OS_AXISANGLE_DEGREES: 104
OD_AXISANGLE_DEGREES: 119
OS_K2POWER_DIOPTERS: 45.00
OS_K1POWER_DIOPTERS: 44.75
OD_K1POWER_DIOPTERS: 44.25
OD_K2POWER_DIOPTERS: 45.00
OS_AXISANGLE2_DEGREES: 14
OD_AXISANGLE2_DEGREES: 29

## 2024-08-19 ENCOUNTER — POST-OP CHECK (OUTPATIENT)
Dept: URBAN - METROPOLITAN AREA CLINIC 6 | Facility: CLINIC | Age: 69
End: 2024-08-19

## 2024-08-19 DIAGNOSIS — Z96.1: ICD-10-CM

## 2024-08-19 PROCEDURE — 99024 POSTOP FOLLOW-UP VISIT: CPT | Mod: LD

## 2024-08-19 ASSESSMENT — KERATOMETRY
OS_K1POWER_DIOPTERS: 44.75
OD_K2POWER_DIOPTERS: 45.00
OS_AXISANGLE_DEGREES: 104
OS_K2POWER_DIOPTERS: 45.00
OS_AXISANGLE2_DEGREES: 14
OD_AXISANGLE2_DEGREES: 29
OD_AXISANGLE_DEGREES: 119
OD_K1POWER_DIOPTERS: 44.25

## 2024-08-19 ASSESSMENT — TONOMETRY
OD_IOP_MMHG: 10
OS_IOP_MMHG: 09

## 2024-08-19 ASSESSMENT — VISUAL ACUITY
OU_SC: J5
OD_SC: 20/25
OS_SC: 20/30

## 2024-08-27 ENCOUNTER — POST-OP CHECK (OUTPATIENT)
Dept: URBAN - METROPOLITAN AREA CLINIC 6 | Facility: CLINIC | Age: 69
End: 2024-08-27

## 2024-08-27 DIAGNOSIS — Z96.1: ICD-10-CM

## 2024-08-27 PROCEDURE — 99024 POSTOP FOLLOW-UP VISIT: CPT | Mod: LD

## 2024-08-27 ASSESSMENT — VISUAL ACUITY
OS_SC: J3
OS_SC: 20/50-1
OS_PH: 20/30
OD_SC: 20/25+2

## 2024-08-27 ASSESSMENT — KERATOMETRY
OS_AXISANGLE2_DEGREES: 14
OD_AXISANGLE2_DEGREES: 29
OD_K2POWER_DIOPTERS: 45.00
OS_AXISANGLE_DEGREES: 104
OD_K1POWER_DIOPTERS: 44.25
OS_K1POWER_DIOPTERS: 44.75
OS_K2POWER_DIOPTERS: 45.00
OD_AXISANGLE_DEGREES: 119

## 2024-08-27 ASSESSMENT — TONOMETRY
OS_IOP_MMHG: 9
OD_IOP_MMHG: 9

## 2024-10-01 ENCOUNTER — HOSPITAL ENCOUNTER (OUTPATIENT)
Dept: RADIOLOGY | Age: 69
Discharge: HOME | End: 2024-10-01
Payer: COMMERCIAL

## 2024-10-01 VITALS — WEIGHT: 145 LBS | BODY MASS INDEX: 24.16 KG/M2 | HEIGHT: 65 IN

## 2024-10-01 DIAGNOSIS — Z12.31 VISIT FOR SCREENING MAMMOGRAM: ICD-10-CM

## 2024-10-01 PROCEDURE — 77067 SCR MAMMO BI INCL CAD: CPT

## 2024-10-01 PROCEDURE — 77063 BREAST TOMOSYNTHESIS BI: CPT

## 2025-02-07 ENCOUNTER — TELEPHONE (OUTPATIENT)
Dept: HEMATOLOGY ONCOLOGY | Facility: CLINIC | Age: 70
End: 2025-02-07

## 2025-02-07 NOTE — TELEPHONE ENCOUNTER
LM for patient that due to a change in the provider's schedule, her appt. On 6/3/25 with Edda Jurado will need to be rescheduled.

## (undated) DEVICE — TUBING SUCTION 5MM X 12 FT

## (undated) DEVICE — SUT SILK 3-0 18 IN A184H

## (undated) DEVICE — STRL UNIVERSAL MINOR GENERAL: Brand: CARDINAL HEALTH

## (undated) DEVICE — MARGIN MARKER SET

## (undated) DEVICE — INTENDED FOR TISSUE SEPARATION, AND OTHER PROCEDURES THAT REQUIRE A SHARP SURGICAL BLADE TO PUNCTURE OR CUT.: Brand: BARD-PARKER SAFETY BLADES SIZE 15, STERILE

## (undated) DEVICE — GAUZE SPONGES,USP TYPE VII GAUZE, 12 PLY: Brand: CURITY

## (undated) DEVICE — DRAPE PROBE NEO-PROBE/ULTRASOUND

## (undated) DEVICE — CHLORAPREP HI-LITE 26ML ORANGE

## (undated) DEVICE — MEDI-VAC YANK SUCT HNDL W/TPRD BULBOUS TIP: Brand: CARDINAL HEALTH

## (undated) DEVICE — VIAL DECANTER

## (undated) DEVICE — SUT MONOCRYL 4-0 PS-2 18 IN Y496G

## (undated) DEVICE — SUT VICRYL 3-0 SH 27 IN J416H

## (undated) DEVICE — GLOVE SRG BIOGEL 7

## (undated) DEVICE — 3M™ STERI-STRIP™ REINFORCED ADHESIVE SKIN CLOSURES, R1547, 1/2 IN X 4 IN (12 MM X 100 MM), 6 STRIPS/ENVELOPE: Brand: 3M™ STERI-STRIP™

## (undated) DEVICE — SMOKE EVACUATION TUBING WITH 8 IN INTEGRAL WAND AND SPONGE GUARD: Brand: BUFFALO FILTER

## (undated) DEVICE — SUT VICRYL 2-0 REEL 54 IN J286G

## (undated) DEVICE — REM POLYHESIVE ADULT PATIENT RETURN ELECTRODE: Brand: VALLEYLAB

## (undated) DEVICE — LIGHT HANDLE COVER SLEEVE DISP BLUE STELLAR

## (undated) DEVICE — NEEDLE 25G X 1 1/2

## (undated) DEVICE — ADHESIVE SKN CLSR HISTOACRYL FLEX 0.5ML LF

## (undated) DEVICE — SCD SEQUENTIAL COMPRESSION COMFORT SLEEVE MEDIUM KNEE LENGTH: Brand: KENDALL SCD